# Patient Record
Sex: FEMALE | Race: WHITE | Employment: OTHER | URBAN - METROPOLITAN AREA
[De-identification: names, ages, dates, MRNs, and addresses within clinical notes are randomized per-mention and may not be internally consistent; named-entity substitution may affect disease eponyms.]

---

## 2017-01-03 ENCOUNTER — OFFICE VISIT (OUTPATIENT)
Dept: SURGERY | Facility: CLINIC | Age: 67
End: 2017-01-03

## 2017-01-03 VITALS
DIASTOLIC BLOOD PRESSURE: 73 MMHG | BODY MASS INDEX: 25.86 KG/M2 | SYSTOLIC BLOOD PRESSURE: 139 MMHG | OXYGEN SATURATION: 99 % | WEIGHT: 155.2 LBS | HEART RATE: 110 BPM | HEIGHT: 65 IN

## 2017-01-03 DIAGNOSIS — C21.0 ANAL SQUAMOUS CELL CARCINOMA (H): ICD-10-CM

## 2017-01-03 DIAGNOSIS — C21.0 ANAL SQUAMOUS CELL CARCINOMA (H): Primary | ICD-10-CM

## 2017-01-03 LAB
ALBUMIN SERPL-MCNC: 3.7 G/DL (ref 3.4–5)
ALP SERPL-CCNC: 88 U/L (ref 40–150)
ALT SERPL W P-5'-P-CCNC: 14 U/L (ref 0–50)
ANION GAP SERPL CALCULATED.3IONS-SCNC: 6 MMOL/L (ref 3–14)
AST SERPL W P-5'-P-CCNC: 19 U/L (ref 0–45)
BASOPHILS # BLD AUTO: 0 10E9/L (ref 0–0.2)
BASOPHILS NFR BLD AUTO: 0.3 %
BILIRUB SERPL-MCNC: 0.9 MG/DL (ref 0.2–1.3)
BUN SERPL-MCNC: 12 MG/DL (ref 7–30)
CALCIUM SERPL-MCNC: 9.1 MG/DL (ref 8.5–10.1)
CHLORIDE SERPL-SCNC: 106 MMOL/L (ref 94–109)
CO2 SERPL-SCNC: 28 MMOL/L (ref 20–32)
CREAT SERPL-MCNC: 0.74 MG/DL (ref 0.52–1.04)
DIFFERENTIAL METHOD BLD: NORMAL
EOSINOPHIL # BLD AUTO: 0 10E9/L (ref 0–0.7)
EOSINOPHIL NFR BLD AUTO: 0.3 %
ERYTHROCYTE [DISTWIDTH] IN BLOOD BY AUTOMATED COUNT: 12.9 % (ref 10–15)
GFR SERPL CREATININE-BSD FRML MDRD: 78 ML/MIN/1.7M2
GLUCOSE SERPL-MCNC: 98 MG/DL (ref 70–99)
HCT VFR BLD AUTO: 40 % (ref 35–47)
HGB BLD-MCNC: 13 G/DL (ref 11.7–15.7)
IMM GRANULOCYTES # BLD: 0 10E9/L (ref 0–0.4)
IMM GRANULOCYTES NFR BLD: 0.3 %
LYMPHOCYTES # BLD AUTO: 1.1 10E9/L (ref 0.8–5.3)
LYMPHOCYTES NFR BLD AUTO: 16.1 %
MCH RBC QN AUTO: 29.9 PG (ref 26.5–33)
MCHC RBC AUTO-ENTMCNC: 32.5 G/DL (ref 31.5–36.5)
MCV RBC AUTO: 92 FL (ref 78–100)
MONOCYTES # BLD AUTO: 0.5 10E9/L (ref 0–1.3)
MONOCYTES NFR BLD AUTO: 7.1 %
NEUTROPHILS # BLD AUTO: 5 10E9/L (ref 1.6–8.3)
NEUTROPHILS NFR BLD AUTO: 75.9 %
NRBC # BLD AUTO: 0 10*3/UL
NRBC BLD AUTO-RTO: 0 /100
PLATELET # BLD AUTO: 286 10E9/L (ref 150–450)
POTASSIUM SERPL-SCNC: 4 MMOL/L (ref 3.4–5.3)
PROT SERPL-MCNC: 7.3 G/DL (ref 6.8–8.8)
RBC # BLD AUTO: 4.35 10E12/L (ref 3.8–5.2)
SODIUM SERPL-SCNC: 140 MMOL/L (ref 133–144)
WBC # BLD AUTO: 6.6 10E9/L (ref 4–11)

## 2017-01-03 ASSESSMENT — PAIN SCALES - GENERAL: PAINLEVEL: NO PAIN (0)

## 2017-01-03 NOTE — NURSING NOTE
"Chief Complaint   Patient presents with     RECHECK     Follow up        Filed Vitals:    01/03/17 1052   BP: 139/73   Pulse: 110   Height: 5' 5\"   Weight: 155 lb 3.2 oz   SpO2: 99%       Body mass index is 25.83 kg/(m^2).      Andrea Matias CMA                                "

## 2017-01-03 NOTE — Clinical Note
1/3/2017       RE: Mya Gilmore  [address] [place] [address]     Dear Colleague,    Thank you for referring your patient, Mya Gilmore, to the COLON AND RECTAL SURGERY at [facility]. Please see a copy of my visit note below.    Colon and Rectal Surgery Clinic Note      RE: Mya Gilmore  : 1950  HECTOR: 1/3/2017      HISTORY OF PRESENT ILLNESS:  Mya returns for a followup visit.  She underwent chemoradiation for a stage IIIB/C, T3N3M0 anal cancer that she completed on 2016.  She continues to have her oncologic followup with Dr. Martin and is scheduled to see her sometime within the next month or 2.  I last saw her on 2016 and noted radiation telangiectasias and an anterior scar, but no evidence of recurrence.  Since that visit, Mya has noted several episodes of fresh red blood with bowel movements.  She otherwise is feeling well.      PHYSICAL EXAMINATION:  Perianal examination again demonstrates radiation telangiectasias, more marked on the left side than the right, on the perianal skin.  Digital rectal examination again shows an anterior scar that feels fibrotic and not rock-hard.  On anoscopy, there is a well-healed anterior scar, unchanged from its previous appearance.  Several distal anal telangiectasias are noted consistent with her prior radiation.      IMPRESSION:  Mya has a stable-appearing anal canal with a well-healed scar and no evidence of tumor recurrence.  Her hematochezia is almost certainly from her telangiectasias.  She does have small internal hemorrhoids, but I think the telangiectasias are the more likely problem.  I reassured her in this regard.  She will maintain her followup with Dr. Martin.  I asked her to follow up with me in 4 months' time.  We discussed her colonoscopy scheduling, which I think should be on an every 5 year basis based on her irradiation.  Her last colonoscopy was about   "years ago, so she will plan to schedule one in another 21/2 years.      I answered all of Mya's questions to her stated satisfaction.  She expressed an understanding and agreement with the proposed plan.      Total time 15 minutes.  Greater than half the time was spent in counseling.      cc:   Soren Camara MD   [facility]    [address] [place] [address]      Mora Martin MD   [facility]    [address] [place] [address]           For details of past medical history, surgical history, family history, medications, allergies, and review of systems, please see details below.    Medical history:  Past Medical History   Diagnosis Date     Osteopenia      Hemorrhoid      Irritable bowel syndrome      Cancer (H) 8/28/2014     Rectal      Breast cancer (H) 2010     Stage 0       Surgical history:  Past Surgical History   Procedure Laterality Date     Lumpectomy breast       Right breast     Tubal ligation       Colonoscopy  8/2013       Family history:  Family History   Problem Relation Age of Onset     CANCER Mother      ovarian     CANCER Sister      colon       Medications:  Current Outpatient Prescriptions   Medication Sig Dispense Refill     Probiotic Product (ULTRAFLORA TopPatch HEALTH PO)        Allergies:  The patientis allergic to nka.    Social history:  Social History   Substance Use Topics     Smoking status: Former Smoker     Smokeless tobacco: Never Used      Comment: Was social smoker until age 28;      Alcohol Use: Yes      Comment: occasionally     Marital status: .    Review of Systems:  Nursing Notes:   Andrea Matias CMA  1/3/2017 10:54 AM  Signed  Chief Complaint   Patient presents with     RECHECK     Follow up        Filed Vitals:    01/03/17 1052   BP: 139/73   Pulse: 110   Height: 5' 5\"   Weight: 155 lb 3.2 oz   SpO2: 99%       Body mass index is 25.83 kg/(m^2).      JULIAN Corbett " MD Александр   Professor and Chief  Division of Colon and Rectal Surgery  [facility]      Referring Provider:  Referred Self, MD  No address on file     Primary Care Provider:  Soren Camara        Again, thank you for allowing me to participate in the care of your patient.      Sincerely,    Jose Ramon Fountain MD

## 2017-01-03 NOTE — Clinical Note
1/3/2017      RE: Mya Gilmore  [address] [place] [address]       Colon and Rectal Surgery Clinic Note      RE: Mya Gilmore  : 1950  HECTOR: 1/3/2017      HISTORY OF PRESENT ILLNESS:  Mya returns for a followup visit.  She underwent chemoradiation for a stage IIIB/C, T3N3M0 anal cancer that she completed on 2016.  She continues to have her oncologic followup with Dr. Martin and is scheduled to see her sometime within the next month or 2.  I last saw her on 2016 and noted radiation telangiectasias and an anterior scar, but no evidence of recurrence.  Since that visit, Mya has noted several episodes of fresh red blood with bowel movements.  She otherwise is feeling well.      PHYSICAL EXAMINATION:  Perianal examination again demonstrates radiation telangiectasias, more marked on the left side than the right, on the perianal skin.  Digital rectal examination again shows an anterior scar that feels fibrotic and not rock-hard.  On anoscopy, there is a well-healed anterior scar, unchanged from its previous appearance.  Several distal anal telangiectasias are noted consistent with her prior radiation.      IMPRESSION:  Mya has a stable-appearing anal canal with a well-healed scar and no evidence of tumor recurrence.  Her hematochezia is almost certainly from her telangiectasias.  She does have small internal hemorrhoids, but I think the telangiectasias are the more likely problem.  I reassured her in this regard.  She will maintain her followup with Dr. Martin.  I asked her to follow up with me in 4 months' time.  We discussed her colonoscopy scheduling, which I think should be on an every 5 year basis based on her irradiation.  Her last colonoscopy was about 21/2 years ago, so she will plan to schedule one in another 21/2 years.      I answered all of Mya's questions to her stated satisfaction.  She expressed an understanding and agreement with the proposed  "plan.      Total time 15 minutes.  Greater than half the time was spent in counseling.      cc:   [place] [address]      Mora Martin MD   [facility] [address] [address]           For details of past medical history, surgical history, family history, medications, allergies, and review of systems, please see details below.    Medical history:  Past Medical History   Diagnosis Date     Osteopenia      Hemorrhoid      Irritable bowel syndrome      Cancer (H) 8/28/2014     Rectal      Breast cancer (H) 2010     Stage 0       Surgical history:  Past Surgical History   Procedure Laterality Date     Lumpectomy breast       Right breast     Tubal ligation       Colonoscopy  8/2013       Family history:  Family History   Problem Relation Age of Onset     CANCER Mother      ovarian     CANCER Sister      colon       Medications:  Current Outpatient Prescriptions   Medication Sig Dispense Refill     Probiotic Product (ULTRAFLORA IMMUNE HEALTH PO)        Allergies:  The patientis allergic to nka.    Social history:  Social History   Substance Use Topics     Smoking status: Former Smoker     Smokeless tobacco: Never Used      Comment: Was social smoker until age 28;      Alcohol Use: Yes      Comment: occasionally     Marital status: .    Review of Systems:  Nursing Notes:   Andrea Matias CMA  1/3/2017 10:54 AM  Signed  Chief Complaint   Patient presents with     RECHECK     Follow up        Filed Vitals:    01/03/17 1052   BP: 139/73   Pulse: 110   Height: 5' 5\"   Weight: 155 lb 3.2 oz   SpO2: 99%       Body mass index is 25.83 kg/(m^2).      JULIAN Corbett MD   Professor and Chief  Division of Colon and Rectal Surgery  [facility]      Referring Provider:  Referred Self, MD  No address on file     Primary Care Provider:  Soren Camara          "

## 2017-01-03 NOTE — PROGRESS NOTES
Colon and Rectal Surgery Clinic Note      RE: Mya Gilmore  : 1950  HECTOR: 1/3/2017      HISTORY OF PRESENT ILLNESS:  Mya returns for a followup visit.  She underwent chemoradiation for a stage IIIB/C, T3N3M0 anal cancer that she completed on 2016.  She continues to have her oncologic followup with Dr. Martin and is scheduled to see her sometime within the next month or 2.  I last saw her on 2016 and noted radiation telangiectasias and an anterior scar, but no evidence of recurrence.  Since that visit, Mya has noted several episodes of fresh red blood with bowel movements.  She otherwise is feeling well.      PHYSICAL EXAMINATION:  Perianal examination again demonstrates radiation telangiectasias, more marked on the left side than the right, on the perianal skin.  Digital rectal examination again shows an anterior scar that feels fibrotic and not rock-hard.  On anoscopy, there is a well-healed anterior scar, unchanged from its previous appearance.  Several distal anal telangiectasias are noted consistent with her prior radiation.      IMPRESSION:  Mya has a stable-appearing anal canal with a well-healed scar and no evidence of tumor recurrence.  Her hematochezia is almost certainly from her telangiectasias.  She does have small internal hemorrhoids, but I think the telangiectasias are the more likely problem.  I reassured her in this regard.  She will maintain her followup with Dr. Martin.  I asked her to follow up with me in 4 months' time.  We discussed her colonoscopy scheduling, which I think should be on an every 5 year basis based on her irradiation.  Her last colonoscopy was about 21/2 years ago, so she will plan to schedule one in another 21/2 years.      I answered all of Mya's questions to her stated satisfaction.  She expressed an understanding and agreement with the proposed plan.      Total time 15 minutes.  Greater than half the time was spent in counseling.     "  cc:   Soren Camara MD   [facility]    [address] [place], [place] [id number]      Mora Martin MD   [facility]    [address] [place] [address]           For details of past medical history, surgical history, family history, medications, allergies, and review of systems, please see details below.    Medical history:  Past Medical History   Diagnosis Date     Osteopenia      Hemorrhoid      Irritable bowel syndrome      Cancer (H) 8/28/2014     Rectal      Breast cancer (H) 2010     Stage 0       Surgical history:  Past Surgical History   Procedure Laterality Date     Lumpectomy breast       Right breast     Tubal ligation       Colonoscopy  8/2013       Family history:  Family History   Problem Relation Age of Onset     CANCER Mother      ovarian     CANCER Sister      colon       Medications:  Current Outpatient Prescriptions   Medication Sig Dispense Refill     Probiotic Product (ULTRAFLORA [employer] HEALTH PO)        Allergies:  The patientis allergic to nka.    Social history:  Social History   Substance Use Topics     Smoking status: Former Smoker     Smokeless tobacco: Never Used      Comment: Was social smoker until age 28;      Alcohol Use: Yes      Comment: occasionally     Marital status: .    Review of Systems:  Nursing Notes:   Andrea Matias CMA  1/3/2017 10:54 AM  Signed  Chief Complaint   Patient presents with     RECHECK     Follow up        Filed Vitals:    01/03/17 1052   BP: 139/73   Pulse: 110   Height: 5' 5\"   Weight: 155 lb 3.2 oz   SpO2: 99%       Body mass index is 25.83 kg/(m^2).      JULIAN Corbett MD   Professor and Chief  Division of Colon and Rectal Surgery  [facility]      Referring Provider:  Referred Self, MD  No address on file     Primary Care Provider:  Soren Camara      "

## 2017-01-03 NOTE — MR AVS SNAPSHOT
After Visit Summary   1/3/2017    Mya Gilmore    MRN: [id number]           Patient Information     Date Of Birth          1950        Visit Information        Provider Department      1/3/2017 11:00 AM Jose Ramon Fountain MD Colon and Rectal Surgery         Follow-ups after your visit        Your next 10 appointments already scheduled     Jan 03, 2017 12:00 PM   Lab with  LAB   [facility] ([facility])    [address] [place] [id number]   [phone]            Mar 06, 2017 11:15 AM   Lab with  LAB   [facility] ([facility])    [address] [place] [id number]   [phone]            Mar 06, 2017 11:40 AM   (Arrive by 11:25 AM)   CT CHEST ABDOMEN PELVIS W/O & W CONTRAST with UCCT2   [facility] CT ([facility])    [address] [place] [id number]   [phone]           Please bring any scans or X-rays taken at other hospitals, if similar tests were done. Also bring a list of your medicines, including vitamins, minerals and over-the-counter drugs. It is safest to leave personal items at home.  Be sure to tell your doctor:   If you have any allergies.   If there s any chance you are pregnant.   If you are breastfeeding.   If you have any special needs.  You may have contrast for this exam. To prepare:   Do not eat or drink for 2 hours before your exam. If you need to take medicine, you may take it with small sips of water. (We may ask you to take liquid medicine as well.)   The day before your exam, drink extra fluids at least six 8-ounce glasses (unless your doctor tells you to restrict your fluids).  Patients over 70 or patients with diabetes or kidney problems:   If you haven t had a blood test (creatinine test) within the last 30 days, go to your clinic or Diagnostic Imaging Department for this test.  If you have  diabetes:   If your kidney function is normal, continue taking your metformin (Avandamet, Glucophage, Glucovance, Metaglip) on the day of your exam.   If your kidney function is abnormal, wait 48 hours before restarting this medicine.  You will have oral contrast for this exam:   You will drink the contrast at home. Get this from your clinic or Diagnostic Imaging Department. Please follow the directions given.  Please wear loose clothing, such as a sweat suit or jogging clothes. Avoid snaps, zippers and other metal. We may ask you to undress and put on a hospital gown.  If you have any questions, please call the Imaging Department where you will have your exam.            Mar 06, 2017  2:30 PM   (Arrive by 2:15 PM)   Return Visit with Mora Martin MD   [facility] ([facility])    [address] [place] [address]  2nd [facility] [id number] [phone]            May 09, 2017 11:00 AM   (Arrive by 10:45 AM)   Return Visit with Jose Ramon Fountain MD   Colon and Rectal Surgery ([facility])    [address] [place] [address]  4th [facility] [address] [phone]              Who to contact     Please call your clinic at [phone] to:    Ask questions about your health    Make or cancel appointments    Discuss your medicines    Learn about your test results    Speak to your doctor   If you have compliments or concerns about an experience at your clinic, or if you wish to file a complaint, please contact [facility] Patient Relations at [phone] or email us at [email or web address]         Additional Information About Your Visit        [employer] Information     [employer] is an electronic gateway that provides easy, online access to your medical records. With [employer], you can request a clinic appointment, read your test results, renew a prescription or communicate with your care team.     To sign up for [employer]  "visit the website at [email or web address]   You will be asked to enter the access code listed below, as well as some personal information. Please follow the directions to create your username and password.     Your access code is: [email or web address]  Expires: 4/3/2017 11:15 AM     Your access code will  in 90 days. If you need help or a new code, please contact your [facility] or call [phone] for assistance.        Care EveryWhere ID     This is your Care EveryWhere ID. This could be used by other organizations to access your [facility] medical records  [id number]        Your Vitals Were     Pulse Height BMI (Body Mass Index) Pulse Oximetry          110 5' 5\" 25.83 kg/m2 99%         Blood Pressure from Last 3 Encounters:   17 139/73   16 136/69   16 133/72    Weight from Last 3 Encounters:   17 155 lb 3.2 oz   16 155 lb 9.6 oz   16 155 lb 3.2 oz              Today, you had the following     No orders found for display       Primary Care Provider Office Phone # Fax #    Soren Camara [phone]       [facility] [address]  [facility] [address]        Thank you!     Thank you for choosing COLON AND RECTAL SURGERY  for your care. Our goal is always to provide you with excellent care. Hearing back from our patients is one way we can continue to improve our services. Please take a few minutes to complete the written survey that you may receive in the mail after your visit with us. Thank you!             Your Updated Medication List - Protect others around you: Learn how to safely use, store and throw away your medicines at www.disposemymeds.org.          This list is accurate as of: 1/3/17 11:15 AM.  Always use your most recent med list.                   Brand Name Dispense Instructions for use    [facility] Hospital[facility] [employer]Tap (formerly [employer] HEALTH PO            "

## 2017-04-03 ENCOUNTER — ONCOLOGY VISIT (OUTPATIENT)
Dept: ONCOLOGY | Facility: CLINIC | Age: 67
End: 2017-04-03
Attending: INTERNAL MEDICINE
Payer: MEDICARE

## 2017-04-03 VITALS
OXYGEN SATURATION: 96 % | WEIGHT: 149.25 LBS | DIASTOLIC BLOOD PRESSURE: 80 MMHG | BODY MASS INDEX: 24.84 KG/M2 | RESPIRATION RATE: 16 BRPM | SYSTOLIC BLOOD PRESSURE: 131 MMHG | HEART RATE: 101 BPM | TEMPERATURE: 98.2 F

## 2017-04-03 DIAGNOSIS — C21.0 ANAL SQUAMOUS CELL CARCINOMA (H): ICD-10-CM

## 2017-04-03 DIAGNOSIS — C21.0 ANAL SQUAMOUS CELL CARCINOMA (H): Primary | ICD-10-CM

## 2017-04-03 LAB
ALBUMIN SERPL-MCNC: 3.7 G/DL (ref 3.4–5)
ALP SERPL-CCNC: 96 U/L (ref 40–150)
ALT SERPL W P-5'-P-CCNC: 12 U/L (ref 0–50)
ANION GAP SERPL CALCULATED.3IONS-SCNC: 8 MMOL/L (ref 3–14)
AST SERPL W P-5'-P-CCNC: 15 U/L (ref 0–45)
BASOPHILS # BLD AUTO: 0 10E9/L (ref 0–0.2)
BASOPHILS NFR BLD AUTO: 0.5 %
BILIRUB SERPL-MCNC: 0.7 MG/DL (ref 0.2–1.3)
BUN SERPL-MCNC: 13 MG/DL (ref 7–30)
CALCIUM SERPL-MCNC: 9.4 MG/DL (ref 8.5–10.1)
CHLORIDE SERPL-SCNC: 106 MMOL/L (ref 94–109)
CO2 SERPL-SCNC: 26 MMOL/L (ref 20–32)
CREAT SERPL-MCNC: 0.67 MG/DL (ref 0.52–1.04)
DIFFERENTIAL METHOD BLD: NORMAL
EOSINOPHIL # BLD AUTO: 0 10E9/L (ref 0–0.7)
EOSINOPHIL NFR BLD AUTO: 0.5 %
ERYTHROCYTE [DISTWIDTH] IN BLOOD BY AUTOMATED COUNT: 13 % (ref 10–15)
GFR SERPL CREATININE-BSD FRML MDRD: 88 ML/MIN/1.7M2
GLUCOSE SERPL-MCNC: 93 MG/DL (ref 70–99)
HCT VFR BLD AUTO: 40.9 % (ref 35–47)
HGB BLD-MCNC: 13.5 G/DL (ref 11.7–15.7)
IMM GRANULOCYTES # BLD: 0 10E9/L (ref 0–0.4)
IMM GRANULOCYTES NFR BLD: 0.3 %
LYMPHOCYTES # BLD AUTO: 1 10E9/L (ref 0.8–5.3)
LYMPHOCYTES NFR BLD AUTO: 18.2 %
MCH RBC QN AUTO: 30.4 PG (ref 26.5–33)
MCHC RBC AUTO-ENTMCNC: 33 G/DL (ref 31.5–36.5)
MCV RBC AUTO: 92 FL (ref 78–100)
MONOCYTES # BLD AUTO: 0.5 10E9/L (ref 0–1.3)
MONOCYTES NFR BLD AUTO: 9.1 %
NEUTROPHILS # BLD AUTO: 4.1 10E9/L (ref 1.6–8.3)
NEUTROPHILS NFR BLD AUTO: 71.4 %
NRBC # BLD AUTO: 0 10*3/UL
NRBC BLD AUTO-RTO: 0 /100
PLATELET # BLD AUTO: 319 10E9/L (ref 150–450)
POTASSIUM SERPL-SCNC: 4.3 MMOL/L (ref 3.4–5.3)
PROT SERPL-MCNC: 7.4 G/DL (ref 6.8–8.8)
RBC # BLD AUTO: 4.44 10E12/L (ref 3.8–5.2)
SODIUM SERPL-SCNC: 141 MMOL/L (ref 133–144)
WBC # BLD AUTO: 5.7 10E9/L (ref 4–11)

## 2017-04-03 PROCEDURE — 80053 COMPREHEN METABOLIC PANEL: CPT | Performed by: INTERNAL MEDICINE

## 2017-04-03 PROCEDURE — 99213 OFFICE O/P EST LOW 20 MIN: CPT | Mod: 25 | Performed by: INTERNAL MEDICINE

## 2017-04-03 PROCEDURE — 85025 COMPLETE CBC W/AUTO DIFF WBC: CPT | Performed by: INTERNAL MEDICINE

## 2017-04-03 PROCEDURE — 99211 OFF/OP EST MAY X REQ PHY/QHP: CPT

## 2017-04-03 PROCEDURE — 36415 COLL VENOUS BLD VENIPUNCTURE: CPT | Performed by: INTERNAL MEDICINE

## 2017-04-03 NOTE — Clinical Note
4/3/2017       RE: Mya Gilmore  [address] [place] [address]     Dear Colleague,    Thank you for referring your patient, Mya Gilmore, to the [facility]. Please see a copy of my visit note below.    No notes on file    Again, thank you for allowing me to participate in the care of your patient.      Sincerely,    Mora Martin MD

## 2017-04-03 NOTE — MR AVS SNAPSHOT
After Visit Summary   4/3/2017    Mya Gilmore    MRN: [id number]           Patient Information     Date Of Birth          1950        Visit Information        Provider Department      4/3/2017 1:00 PM Mora Martin MD [facility]        Today's Diagnoses     Anal squamous cell carcinoma (H)    -  1       Follow-ups after your visit        Follow-up notes from your care team     Return in about 6 months (around 10/3/2017).      Your next 10 appointments already scheduled     Oct 24, 2017 11:00 AM CDT   (Arrive by 10:45 AM)   Return Visit with Jose Ramon Fountain MD   [facility] Colon and Rectal Surgery ([facility])    [address] [place] [address]  4th Floor  [facility] [address] [phone]            Oct 25, 2017 10:15 AM CDT   (Arrive by 10:00 AM)   Return Visit with Mora Martin MD   [facility] ([facility])    [address] [place] [address]  2nd [facility] [address] [phone]              Who to contact     If you have questions or need follow up information about today's clinic visit or your schedule please contact [facility] directly at [phone].  Normal or non-critical lab and imaging results will be communicated to you by [employer], letter or phone within 4 business days after the clinic has received the results. If you do not hear from us within 7 days, please contact the clinic through [employer] or phone. If you have a critical or abnormal lab result, we will notify you by phone as soon as possible.  Submit refill requests through [employer] or call your pharmacy and they will forward the refill request to us. Please allow 3 business days for your refill to be completed.          Additional Information About Your Visit        [employer] Information     [employer] lets you send messages to your doctor, view your test results, renew your prescriptions, schedule appointments and more.  "To sign up, go to [email or web address] . Click on \"Log in\" on the left side of the screen, which will take you to the Welcome page. Then click on \"Sign up Now\" on the right side of the page.     You will be asked to enter the access code listed below, as well as some personal information. Please follow the directions to create your username and password.     Your access code is: [email or web address]  Expires: 11/3/2017  6:52 PM     Your access code will  in 90 days. If you need help or a new code, please call your [facility] clinic or [phone].        Care EveryWhere ID     This is your Care EveryWhere ID. This could be used by other organizations to access your [facility] medical records  [id number]        Your Vitals Were     Pulse Temperature Respirations Pulse Oximetry BMI (Body Mass Index)       101 98.2  F (36.8  C) 16 96% 24.84 kg/m2        Blood Pressure from Last 3 Encounters:   17 140/65   17 131/80   17 139/73    Weight from Last 3 Encounters:   17 67 kg (147 lb 11.2 oz)   17 67.7 kg (149 lb 4 oz)   17 70.4 kg (155 lb 3.2 oz)               Primary Care Provider Office Phone # Fax #    Soren Camara [phone]       [facility] [address]  [facility] [address]        Equal Access to Services     [facility] AH: Hadii aad ku hadasho Soomaali, waaxda luqadaha, qaybta kaalmada adeegyada, mani melvin . So [facility] [phone].    ATENCIÓN: Si habla español, tiene a gupta disposición servicios gratuitos de asistencia lingüística. Llame al [phone].    We comply with applicable federal civil rights laws and [place] laws. We do not discriminate on the basis of race, color, national origin, age, disability sex, sexual orientation or gender identity.            Thank you!     Thank you for choosing [facility]  for your care. Our goal is always to provide you with excellent care. Hearing back from our " patients is one way we can continue to improve our services. Please take a few minutes to complete the written survey that you may receive in the mail after your visit with us. Thank you!             Your Updated Medication List - Protect others around you: Learn how to safely use, store and throw away your medicines at www.disposemymeds.org.          This list is accurate as of: 4/3/17 11:59 PM.  Always use your most recent med list.                   Brand Name Dispense Instructions for use Diagnosis    [facility] ADOP HEALTH PO

## 2017-04-03 NOTE — LETTER
4/3/2017      RE: Mya Gilmore  [address] [place] [address]       No recurrence - RTC in 6 mths.     Mora Martin MD

## 2017-04-03 NOTE — NURSING NOTE
"Mya Gilmore is a 67 year old female who presents for:  Chief Complaint   Patient presents with     RECHECK     Patient here for return visit r/t Squamous cell carcinoma.         Initial Vitals:  /80  Pulse 101  Temp 98.2  F (36.8  C)  Resp 16  Wt 67.7 kg (149 lb 4 oz)  SpO2 96%  BMI 24.84 kg/m2 Estimated body mass index is 24.84 kg/(m^2) as calculated from the following:    Height as of 1/3/17: 1.651 m (5' 5\").    Weight as of this encounter: 67.7 kg (149 lb 4 oz).. Body surface area is 1.76 meters squared. BP completed using cuff size: regular  Data Unavailable No LMP recorded. Patient is postmenopausal. Allergies and medications reviewed.     Medications: Medication refills not needed today.  Pharmacy name entered into M[facility]: [facility] [address] [place] [address] AT [facility]    Comments: NA    2 minutes for nursing intake (face to face time)   Marilyn Mckeon RN        "

## 2017-04-04 ENCOUNTER — TELEPHONE (OUTPATIENT)
Dept: ONCOLOGY | Facility: CLINIC | Age: 67
End: 2017-04-04

## 2017-04-04 NOTE — PROGRESS NOTES
This is a followup visit   Apr 3, 2017      CHIEF COMPLAINT:  Squamous cell carcinoma of the anal canal, stage III-B/C, T3 N3 M0.  She was initially seen in 09/2014.      BACKGROUND:      The patient has a past medical history significant for DCIS of the right breast, status post lumpectomy and radiation completed in 2009 in [place], who presented with intermittent rectal bleeding for the past couple of months.  She saw Dr. Shaikh, and had an EUA on 08/28/2014.  Examination revealed a large palpable mass measuring 6 cm long and 4.5 cm thick involving the entire rectovaginal septum up to and including the anus with involvement of the external skin.  Biopsy confirmed deeply invasive poorly differentiated squamous cell carcinoma.  She had a staging CT of the chest, abdomen and pelvis on 09/27/2014, which showed a 6.6 x 5.7 x 5 cm rectal mass with rectal lumen narrowing to less than 5 mm, and a left lower lobe subcentimeter pulmonary nodule.  The patient was referred to see Dr. Fountain on 09/09/2014.  A physical exam did not show any palpable groin nodes.  There was a soft tissue mass at the anal verge anteriorly, and perineal body looked widened.  Distal vaginal exam demonstrated a hard mass that ran the length of the rectovaginal septum up to nearly the cervix with some mucus discharge from the vagina that did not look like bowel content.  The digital rectal exam confirmed the findings, and pathology, which was reviewed at our center, showed invasive poorly differentiated squamous cell carcinoma.  She proceeded to have a PET/CT scan on 09/23/2014.  Preliminary reports on the results confirmed the finding with the extent of the mass measuring 6.8 x 6.7 x 8.2 cm with an SUV of 20, and no definitive soft tissue metastases in the abdomen or pelvis.  There were multiple subcentimeter pulmonary nodules in the left lower lobe.  In addition, there was bilateral inguinal lymphadenopathy that was not hypermetabolic, but  maximum SUV of 2.6.  Her case was discussed at GI Conference, and the patient was recommended to do definitive chemoradiotherapy.  She saw Dr. Washington, and Dr. Cano decided to treat her with 5-FU and mitomycin along with radiation therapy.  The patient completed her chemoradiation course on 11/21/2014.       INTERVAL HISTORY:  The patient states that she is feeling well.  She states that she has been keeping busy off lately preparing for her daughters wedding. Also she fell and has a # in her L hand which hurts her a lot.  Otherwise, a 14-point review of systems was done and was negative.      PAST MEDICAL HISTORY:   Past Medical History:   Diagnosis Date     Breast cancer (H) 2010    Stage 0     Cancer (H) 8/28/2014    Rectal      Hemorrhoid      Irritable bowel syndrome      Osteopenia           PAST SURGICAL HISTORY:   Past Surgical History:   Procedure Laterality Date     COLONOSCOPY  8/2013     LUMPECTOMY BREAST      Right breast     TUBAL LIGATION            SOCIAL HISTORY:   Social History     Social History     Marital status:      Spouse name: N/A     Number of children: N/A     Years of education: N/A     Occupational History     Not on file.     Social History Main Topics     Smoking status: Former Smoker     Smokeless tobacco: Never Used      Comment: Was social smoker until age 28;      Alcohol use Yes      Comment: occasionally     Drug use: No     Sexual activity: Not on file     Other Topics Concern     Not on file     Social History Narrative          ALLERGIES:       Allergies   Allergen Reactions     Nka [No Known Allergies]           MEDICATIONS:   Current Outpatient Prescriptions   Medication     Probiotic Product (MeusonicA [employer] PO)     No current facility-administered medications for this visit.           FAMILY HISTORY:   Family History   Problem Relation Age of Onset     CANCER Mother      ovarian     CANCER Sister      colon          REVIEW OF SYSTEMS:  A 14-point  review of systems was done and was negative, unless as mentioned in the HPI.      PHYSICAL EXAMINATION:   VITAL SIGNS: /80  Pulse 101  Temp 98.2  F (36.8  C)  Resp 16  Wt 67.7 kg (149 lb 4 oz)  SpO2 96%  BMI 24.84 kg/m2    GENERAL:  Alert and oriented x3, no apparent distress.   HEENT:  Unremarkable for any mucositis or moist mucous membranes.   CARDIOVASCULAR:  S1, S2, clear.  No murmur, gallop or rub.   RESPIRATORY:  Clear to auscultation bilaterally.  No added sounds heard.   ABDOMEN:  Nontender to palpation.  No organomegaly appreciated.   RECTAL:  Exam was not performed.   EXTREMITIES:  Lower extremities with no pedal edema.   PSYCHIATRIC:  Affect appropriate.        LABORATORY DATA:  reveiwed in EMR and discussed with the patient      ASSESSMENT AND PLAN:  Patient with stage III-B/C anal canal cancer, T3 N3 M0, status post chemoradiation with 5-FU and mitomycin.  She completed her chemoradiation course on 11/21/2014 with no signs of local recurrence. Because of the T3 N3 nature of disease, she would need annual CT chest, abdomen and pelvis for the first 3 years. Her scan looks good.   She will RTC in 6 mths with labs and visit    # in Left forearm: management per local team.       Gyn : she is concerned about ovarian cancer and we discussed that there are no good screening tests, she should keep working with her PCP .     Mora Martin   of Medicine   Hematology and medical Oncology   [facility]

## 2017-04-20 ENCOUNTER — TELEPHONE (OUTPATIENT)
Dept: SURGERY | Facility: CLINIC | Age: 67
End: 2017-04-20

## 2017-04-20 NOTE — TELEPHONE ENCOUNTER
Due to provider unavailability (surgery), patients surgery on 05/09/17 needs to be rescheduled. Called patient and left a message informing her of this.  Provided our direct clinic number to reschedule.

## 2017-04-20 NOTE — TELEPHONE ENCOUNTER
*Correction to previous documentation*    Left message to reschedule patient's clinic appointment, not surgery.  (Typing error). RB

## 2017-06-27 ENCOUNTER — OFFICE VISIT (OUTPATIENT)
Dept: SURGERY | Facility: CLINIC | Age: 67
End: 2017-06-27

## 2017-06-27 VITALS
WEIGHT: 147.7 LBS | HEART RATE: 50 BPM | DIASTOLIC BLOOD PRESSURE: 65 MMHG | TEMPERATURE: 98.5 F | BODY MASS INDEX: 24.61 KG/M2 | SYSTOLIC BLOOD PRESSURE: 140 MMHG | HEIGHT: 65 IN | OXYGEN SATURATION: 98 %

## 2017-06-27 DIAGNOSIS — C21.0 ANAL SQUAMOUS CELL CARCINOMA (H): Primary | ICD-10-CM

## 2017-06-27 ASSESSMENT — PAIN SCALES - GENERAL: PAINLEVEL: NO PAIN (0)

## 2017-06-27 NOTE — LETTER
2017       RE: Mya Gilmore  [address] [place] [address]     Dear Colleague,    Thank you for referring your patient, Mya Gilmore, to the [facility] COLON AND RECTAL SURGERY at [facility]. Please see a copy of my visit note below.    Colon and Rectal Surgery Clinic Note      RE: Mya Gilmore  : 1950  HECTOR: 2017    Mya returns for a followup visit of her stage IIIB/C, T3 N3 M0 anal cancer.  She completed chemoradiation on 2016.  She last saw Dr. Martin in April, at which time a CT scan showed no evidence of recurrence.  Mya is feeling well and has no specific complaints.  She is busy planning her daughter's wedding, which will take place in July.  Since I last saw her, she fell off a ladder and fractured her arm.  That has healed.    PHYSICAL EXAMINATION: Mya looks very well.  Perianal examination shows radiation telangiectasias, unchanged.  Digital rectal examination shows anterior scarring, but no hard masses.  Endoscopy shows a fibrotic appearing anterior scar, stable from previous exams.  I do not see any evidence of local recurrence.    IMPRESSION: Mya seems to be doing well and I see no evidence of local recurrence.  I asked her to follow up in 4 months' time, which will be her 3-year anniversary of completing treatment.  After that, we can probably drop back to a q. 6 month surveillance schedule.    Total time 15 minutes, with half the time spent in counseling.    cc:  MD Mora Morin MD    For details of past medical history, surgical history, family history, medications, allergies, and review of systems, please see details below.    Medical history:  Past Medical History:   Diagnosis Date     Breast cancer (H)     Stage 0     Cancer (H) 2014    Rectal      Hemorrhoid      Irritable bowel syndrome      Osteopenia        Surgical history:  Past Surgical History:  "  Procedure Laterality Date     COLONOSCOPY  8/2013     LUMPECTOMY BREAST      Right breast     TUBAL LIGATION         Family history:  Family History   Problem Relation Age of Onset     CANCER Mother      ovarian     CANCER Sister      colon       Medications:  Current Outpatient Prescriptions   Medication Sig Dispense Refill     magnesium 100 MG CAPS        Probiotic Product ([employer] HEALTH PO)        Allergies:  The patientis allergic to nka [no known allergies].  Social history:  Social History   Substance Use Topics     Smoking status: Former Smoker     Smokeless tobacco: Never Used      Comment: Was social smoker until age 28;      Alcohol use Yes      Comment: occasionally     Marital status: .    Review of Systems:  Nursing Notes:   Benita Anderson LPN  6/27/2017 10:43 AM  Signed  Chief Complaint   Patient presents with     Clinic Care Coordination - Follow-up     4 month f/u.        Vitals:    06/27/17 1040   BP: 140/65   BP Location: Right arm   Patient Position: Chair   Cuff Size: Adult Regular   Pulse: 50   Temp: 98.5  F (36.9  C)   TempSrc: Oral   SpO2: 98%   Weight: 147 lb 11.2 oz   Height: 5' 5\"       Body mass index is 24.58 kg/(m^2).    Hayden CARDOSO LPN    Referring Provider:  Referred Self, MD  No address on file     Primary Care Provider:  Soren Camara    Again, thank you for allowing me to participate in the care of your patient.    Jose Ramon Fountain MD    "

## 2017-06-27 NOTE — PROGRESS NOTES
Colon and Rectal Surgery Clinic Note      RE: Mya Gilmore  : 1950  HECTOR: 2017      Mya returns for a followup visit of her stage IIIB/C, T3 N3 M0 anal cancer.  She completed chemoradiation on 2016.  She last saw Dr. Martin in April, at which time a CT scan showed no evidence of recurrence.  Mya is feeling well and has no specific complaints.  She is busy planning her daughter's wedding, which will take place in July.  Since I last saw her, she fell off a ladder and fractured her arm.  That has healed.    PHYSICAL EXAMINATION: Mya looks very well.  Perianal examination shows radiation telangiectasias, unchanged.  Digital rectal examination shows anterior scarring, but no hard masses.  Endoscopy shows a fibrotic appearing anterior scar, stable from previous exams.  I do not see any evidence of local recurrence.    IMPRESSION: Mya seems to be doing well and I see no evidence of local recurrence.  I asked her to follow up in 4 months' time, which will be her 3-year anniversary of completing treatment.  After that, we can probably drop back to a q. 6 month surveillance schedule.    Total time 15 minutes, with half the time spent in counseling.    cc:  MD Mora Morin MD      For details of past medical history, surgical history, family history, medications, allergies, and review of systems, please see details below.    Medical history:  Past Medical History:   Diagnosis Date     Breast cancer (H)     Stage 0     Cancer (H) 2014    Rectal      Hemorrhoid      Irritable bowel syndrome      Osteopenia        Surgical history:  Past Surgical History:   Procedure Laterality Date     COLONOSCOPY  2013     LUMPECTOMY BREAST      Right breast     TUBAL LIGATION         Family history:  Family History   Problem Relation Age of Onset     CANCER Mother      ovarian     CANCER Sister      colon       Medications:  Current Outpatient Prescriptions   Medication  "Sig Dispense Refill     magnesium 100 MG CAPS        Probiotic Product ([employer] HEALTH PO)        Allergies:  The patientis allergic to nka [no known allergies].    Social history:  Social History   Substance Use Topics     Smoking status: Former Smoker     Smokeless tobacco: Never Used      Comment: Was social smoker until age 28;      Alcohol use Yes      Comment: occasionally     Marital status: .    Review of Systems:  Nursing Notes:   Benita Anderson LPN  6/27/2017 10:43 AM  Signed  Chief Complaint   Patient presents with     Clinic Care Coordination - Follow-up     4 month f/u.        Vitals:    06/27/17 1040   BP: 140/65   BP Location: Right arm   Patient Position: Chair   Cuff Size: Adult Regular   Pulse: 50   Temp: 98.5  F (36.9  C)   TempSrc: Oral   SpO2: 98%   Weight: 147 lb 11.2 oz   Height: 5' 5\"       Body mass index is 24.58 kg/(m^2).    HEMANT Abdullahi MD   Professor and Chief  Division of Colon and Rectal Surgery  [facility]      Referring Provider:  Referred Self, MD  No address on file     Primary Care Provider:  Soren Camara  "

## 2017-06-27 NOTE — MR AVS SNAPSHOT
After Visit Summary   2017    Mya Gilmore    MRN: [id number]           Patient Information     Date Of Birth          1950        Visit Information        Provider Department      2017 10:30 AM Jose Ramon Fountain MD [facility] Colon and Rectal Surgery        Today's Diagnoses     Anal squamous cell carcinoma (H)    -  1       Follow-ups after your visit        Follow-up notes from your care team     Return in about 4 months (around 10/27/2017).      Your next 10 appointments already scheduled     Oct 24, 2017 11:00 AM CDT   (Arrive by 10:45 AM)   Return Visit with MD JOLENE Sung [facility] Colon and Rectal Surgery ([facility])    [address] [address] [phone]              Who to contact     Please call your clinic at [phone] to:    Ask questions about your health    Make or cancel appointments    Discuss your medicines    Learn about your test results    Speak to your doctor   If you have compliments or concerns about an experience at your clinic, or if you wish to file a complaint, please contact [facility] Patient Relations at [phone] or email us at [email or web address]         Additional Information About Your Visit        MyChart Information     Augustt is an electronic gateway that provides easy, online access to your medical records. With [employer], you can request a clinic appointment, read your test results, renew a prescription or communicate with your care team.     To sign up for Augustt visit the website at [email or web address]   You will be asked to enter the access code listed below, as well as some personal information. Please follow the directions to create your username and password.     Your access code is: [email or web address]  Expires: 11/3/2017  6:52 PM     Your access code will  in 90 days. If you need help or a new code, please contact your  "[facility] or call [phone] for assistance.        Care EveryWhere ID     This is your Care EveryWhere ID. This could be used by other organizations to access your [place] medical records  [id number]        Your Vitals Were     Pulse Temperature Height Pulse Oximetry BMI (Body Mass Index)       50 98.5  F (36.9  C) (Oral) 1.651 m (5' 5\") 98% 24.58 kg/m2        Blood Pressure from Last 3 Encounters:   06/27/17 140/65   04/03/17 131/80   01/03/17 139/73    Weight from Last 3 Encounters:   06/27/17 67 kg (147 lb 11.2 oz)   04/03/17 67.7 kg (149 lb 4 oz)   01/03/17 70.4 kg (155 lb 3.2 oz)              We Performed the Following     ANOSCOPY W/WO BRUSH/WASH        Primary Care Provider Office Phone # Fax #    Soren Camara [phone]       [facility] [address]  [facility] [address]        Equal Access to Services     [facility]: Hadii aad ku hadasho Sowili, waaxda luqadaha, qaybta kaalmada codie, mani melvin . So [facility] [phone].    ATENCIÓN: Si habla español, tiene a gupta disposición servicios gratuitos de asistencia lingüística. Marieame al [phone].    We comply with applicable federal civil rights laws and [place] laws. We do not discriminate on the basis of race, color, national origin, age, disability sex, sexual orientation or gender identity.            Thank you!     Thank you for choosing [facility] COLON AND RECTAL SURGERY  for your care. Our goal is always to provide you with excellent care. Hearing back from our patients is one way we can continue to improve our services. Please take a few minutes to complete the written survey that you may receive in the mail after your visit with us. Thank you!             Your Updated Medication List - Protect others around you: Learn how to safely use, store and throw away your medicines at www.disposemymeds.org.          This list is accurate as of: 6/27/17 11:59 PM.  " Always use your most recent med list.                   Brand Name Dispense Instructions for use Diagnosis    magnesium 100 MG Caps           [facility] [employer] HEALTH PO

## 2017-06-27 NOTE — NURSING NOTE
"Chief Complaint   Patient presents with     Clinic Care Coordination - Follow-up     4 month f/u.        Vitals:    06/27/17 1040   BP: 140/65   BP Location: Right arm   Patient Position: Chair   Cuff Size: Adult Regular   Pulse: 50   Temp: 98.5  F (36.9  C)   TempSrc: Oral   SpO2: 98%   Weight: 147 lb 11.2 oz   Height: 5' 5\"       Body mass index is 24.58 kg/(m^2).    Hayden CARDOSO LPN                          "

## 2017-10-24 ENCOUNTER — OFFICE VISIT (OUTPATIENT)
Dept: SURGERY | Facility: CLINIC | Age: 67
End: 2017-10-24

## 2017-10-24 VITALS
SYSTOLIC BLOOD PRESSURE: 141 MMHG | HEART RATE: 108 BPM | DIASTOLIC BLOOD PRESSURE: 72 MMHG | HEIGHT: 65 IN | OXYGEN SATURATION: 98 % | BODY MASS INDEX: 24.81 KG/M2 | WEIGHT: 148.9 LBS | TEMPERATURE: 98.5 F

## 2017-10-24 DIAGNOSIS — C21.0 ANAL SQUAMOUS CELL CARCINOMA (H): Primary | ICD-10-CM

## 2017-10-24 ASSESSMENT — PAIN SCALES - GENERAL: PAINLEVEL: NO PAIN (0)

## 2017-10-24 NOTE — NURSING NOTE
"No chief complaint on file.      Vitals:    10/24/17 1044   BP: 141/72   BP Location: Left arm   Patient Position: Chair   Cuff Size: Adult Regular   Pulse: 108   Temp: 98.5  F (36.9  C)   TempSrc: Oral   SpO2: 98%   Weight: 148 lb 14.4 oz   Height: 5' 5\"       Body mass index is 24.78 kg/(m^2).    Hayden CARDOSO LPN                     "

## 2017-10-24 NOTE — LETTER
10/24/2017       RE: Mya Gilmore  [address] [place] [address]     Dear Colleague,    Thank you for referring your patient, Mya Gilmore, to the [facility] COLON AND RECTAL SURGERY at [facility]. Please see a copy of my visit note below.    Colon and Rectal Surgery Clinic Note      RE: Mya Gilmore  : 1950  HECTOR: 10/24/2017      Mya returns for a follow-up visit for stage IIIB/C T3 N3 M0 anal cancer. She completed her chemoradiation in 2014. She has had ongoing oncologic follow-up with Dr. Martin. Mya is feeling generally very well. She did note some rectal bleeding of fresh blood into the toilet bowl over the past week or 2. She had a similar episode before her last clinic visit. Her last colonoscopy was about 4 years ago.    Miladis recently underwent prophylactic oophorectomy due to a family history of ovarian cancer in her mother, who was in her 70s. The gynecologist could evidently only find the left ovary. 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    Physical examination: There are perianal telangiectasias consistent with the patient's prior radiation. Digital rectal examination shows some fibrous scarring of the anal canal but no masses. Anoscopy shows telangiectasias and fibrotic scar anteriorly and possibly to the left. There is nothing at all suggestive of recurrent malignancy. Several anal telangiectasias are also noted, as are grade 2 hemorrhoids posteriorly.    Mya has no evidence of local tumor recurrence. She is probably bleeding from her radiation telangiectasias. The internal hemorrhoids are slightly enlarged but not dramatically so. I do think would be worthwhile in the face of the bleeding to get a repeat colonoscopy now rather than in the previously planned 5 year window, and Mya will schedule this closer to home. I also discussed that she should have BRCA  testing on account of her mother's history of ovarian cancer and her own personal history of breast DCIS. We will refer her to a genetic counselor at [facility] when she returns for her next follow-up visit with Dr. Martin. I asked Miladis to follow up with me in 6 months time.    I answered all of Mya's questions to her stated satisfaction. She expressed her understanding and agreement with the proposed plan.    Total time 20 minutes. Greater than half the time spent counseling.      For details of past medical history, surgical history, family history, medications, allergies, and review of systems, please see details below.    Medical history:  Past Medical History:   Diagnosis Date     Breast cancer (H) 2010    Stage 0     Cancer (H) 8/28/2014    Rectal      Hemorrhoid      Irritable bowel syndrome      Osteopenia        Surgical history:  Past Surgical History:   Procedure Laterality Date     COLONOSCOPY  8/2013     LUMPECTOMY BREAST      Right breast     TUBAL LIGATION         Family history:  Family History   Problem Relation Age of Onset     CANCER Mother      ovarian     CANCER Sister      colon       Medications:  Current Outpatient Prescriptions   Medication Sig Dispense Refill     magnesium 100 MG CAPS        Probiotic Product (boarding passA [employer] PO)        Allergies:  The patientis allergic to nka [no known allergies].    Social history:  Social History   Substance Use Topics     Smoking status: Former Smoker     Smokeless tobacco: Never Used      Comment: Was social smoker until age 28;      Alcohol use Yes      Comment: occasionally     Marital status: .    Review of Systems:  Nursing Notes:   Benita Anderson LPN  10/24/2017 10:46 AM  Signed  No chief complaint on file.      Vitals:    10/24/17 1044   BP: 141/72   BP Location: Left arm   Patient Position: Chair   Cuff Size: Adult Regular   Pulse: 108   Temp: 98.5  F (36.9  C)   TempSrc: Oral   SpO2: 98%   Weight:  "148 lb 14.4 oz   Height: 5' 5\"       Body mass index is 24.78 kg/(m^2).    HEMANT Abdullahi MD   Professor and Chief  Division of Colon and Rectal Surgery  [facility]      Referring Provider:  Referred Self, MD  No address on file     Primary Care Provider:  Soren Camara        "

## 2017-10-24 NOTE — PROGRESS NOTES
Colon and Rectal Surgery Clinic Note      RE: Mya Gilmore  : 1950  HECTOR: 10/24/2017      Mya returns for a follow-up visit for stage IIIB/C T3 N3 M0 anal cancer. She completed her chemoradiation in 2014. She has had ongoing oncologic follow-up with Dr. Martin. Mya is feeling generally very well. She did note some rectal bleeding of fresh blood into the toilet bowl over the past week or 2. She had a similar episode before her last clinic visit. Her last colonoscopy was about 4 years ago.    Miladis recently underwent prophylactic oophorectomy due to a family history of ovarian cancer in her mother, who was in her 70s. The gynecologist could evidently only find the left ovary. 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    Physical examination: There are perianal telangiectasias consistent with the patient's prior radiation. Digital rectal examination shows some fibrous scarring of the anal canal but no masses. Anoscopy shows telangiectasias and fibrotic scar anteriorly and possibly to the left. There is nothing at all suggestive of recurrent malignancy. Several anal telangiectasias are also noted, as are grade 2 hemorrhoids posteriorly.    Mya has no evidence of local tumor recurrence. She is probably bleeding from her radiation telangiectasias. The internal hemorrhoids are slightly enlarged but not dramatically so. I do think would be worthwhile in the face of the bleeding to get a repeat colonoscopy now rather than in the previously planned 5 year window, and Mya will schedule this closer to home. I also discussed that she should have BRCA testing on account of her mother's history of ovarian cancer and her own personal history of breast DCIS. We will refer her to a genetic counselor at [facility] when she returns for her next follow-up visit with Dr. Martin. I asked Miladis to follow up with me in 6 months  "time.    I answered all of Mya's questions to her stated satisfaction. She expressed her understanding and agreement with the proposed plan.    Total time 20 minutes. Greater than half the time spent counseling.      For details of past medical history, surgical history, family history, medications, allergies, and review of systems, please see details below.    Medical history:  Past Medical History:   Diagnosis Date     Breast cancer (H) 2010    Stage 0     Cancer (H) 8/28/2014    Rectal      Hemorrhoid      Irritable bowel syndrome      Osteopenia        Surgical history:  Past Surgical History:   Procedure Laterality Date     COLONOSCOPY  8/2013     LUMPECTOMY BREAST      Right breast     TUBAL LIGATION         Family history:  Family History   Problem Relation Age of Onset     CANCER Mother      ovarian     CANCER Sister      colon       Medications:  Current Outpatient Prescriptions   Medication Sig Dispense Refill     magnesium 100 MG CAPS        Probiotic Product ([employer]rug[employer]s PO)        Allergies:  The patientis allergic to nka [no known allergies].    Social history:  Social History   Substance Use Topics     Smoking status: Former Smoker     Smokeless tobacco: Never Used      Comment: Was social smoker until age 28;      Alcohol use Yes      Comment: occasionally     Marital status: .    Review of Systems:  Nursing Notes:   Benita Anderson LPN  10/24/2017 10:46 AM  Signed  No chief complaint on file.      Vitals:    10/24/17 1044   BP: 141/72   BP Location: Left arm   Patient Position: Chair   Cuff Size: Adult Regular   Pulse: 108   Temp: 98.5  F (36.9  C)   TempSrc: Oral   SpO2: 98%   Weight: 148 lb 14.4 oz   Height: 5' 5\"       Body mass index is 24.78 kg/(m^2).    HEMANT Abdullahi MD   Professor and Chief  Division of Colon and Rectal Surgery  [facility]      Referring Provider:  Referred MD Bartolome  No address on " file     Primary Care Provider:  Soren Camara

## 2018-04-24 ENCOUNTER — OFFICE VISIT (OUTPATIENT)
Dept: SURGERY | Facility: CLINIC | Age: 68
End: 2018-04-24
Payer: COMMERCIAL

## 2018-04-24 VITALS
HEART RATE: 98 BPM | WEIGHT: 153.1 LBS | DIASTOLIC BLOOD PRESSURE: 73 MMHG | BODY MASS INDEX: 25.51 KG/M2 | SYSTOLIC BLOOD PRESSURE: 123 MMHG | OXYGEN SATURATION: 95 % | TEMPERATURE: 98.3 F | HEIGHT: 65 IN

## 2018-04-24 DIAGNOSIS — C21.0 ANAL SQUAMOUS CELL CARCINOMA (H): Primary | ICD-10-CM

## 2018-04-24 DIAGNOSIS — Z80.3 FAMILY HISTORY OF MALIGNANT NEOPLASM OF BREAST: ICD-10-CM

## 2018-04-24 DIAGNOSIS — Z80.41 FAMILY HISTORY OF MALIGNANT NEOPLASM OF OVARY: ICD-10-CM

## 2018-04-24 ASSESSMENT — PAIN SCALES - GENERAL: PAINLEVEL: NO PAIN (0)

## 2018-04-24 NOTE — NURSING NOTE
"Chief Complaint   Patient presents with     Cancer     6 month follow up anal cancer.        Vitals:    04/24/18 1153   BP: 123/73   BP Location: Left arm   Patient Position: Chair   Cuff Size: Adult Regular   Pulse: 98   Temp: 98.3  F (36.8  C)   TempSrc: Oral   SpO2: 95%   Weight: 153 lb 1.6 oz   Height: 5' 5\"       Body mass index is 25.48 kg/(m^2).    Hayden CARDOSO LPN               "

## 2018-04-24 NOTE — MR AVS SNAPSHOT
"              After Visit Summary   2018    Mya Gilmore    MRN: [id number]           Patient Information     Date Of Birth          1950        Visit Information        Provider Department      2018 11:00 AM Jose Ramon Fountain MD [facility] Colon and Rectal Surgery         Follow-ups after your visit        Your next 10 appointments already scheduled     Oct 23, 2018 11:00 AM CDT   (Arrive by 10:45 AM)   Return Visit with MD JOLENE Sung [facility] Colon and Rectal Surgery ([facility] [place])    [address] [place] [id number] [phone]              Who to contact     Please call your clinic at [phone] to:    Ask questions about your health    Make or cancel appointments    Discuss your medicines    Learn about your test results    Speak to your doctor            Additional Information About Your Visit        MyChart Information     Twitter is an electronic gateway that provides easy, online access to your medical records. With Twitter, you can request a clinic appointment, read your test results, renew a prescription or communicate with your care team.     To sign up for [employer] visit the website at [email or web address]   You will be asked to enter the access code listed below, as well as some personal information. Please follow the directions to create your username and password.     Your access code is: [email or web address]  Expires: 2018  6:31 AM     Your access code will  in 90 days. If you need help or a new code, please contact your [facility] or call [phone] for assistance.        Care EveryWhere ID     This is your Care EveryWhere ID. This could be used by other organizations to access your [facility] medical records  [id number]        Your Vitals Were     Pulse Temperature Height Pulse Oximetry BMI (Body Mass Index)       98 98.3  F (36.8  C) (Oral) 5' 5\" 95% 25.48 kg/m2        Blood " Pressure from Last 3 Encounters:   04/24/18 123/73   10/24/17 141/72   06/27/17 140/65    Weight from Last 3 Encounters:   04/24/18 153 lb 1.6 oz   10/24/17 148 lb 14.4 oz   06/27/17 147 lb 11.2 oz              Today, you had the following     No orders found for display       Primary Care Provider Office Phone # Fax #    Soren Camara [phone]       [facility] [place] [address]  [facility] [address]        Equal Access to Services     [facility]: Hadii aad ku hadasho Soomaali, waaxda luqadaha, qaybta kaalmada ademagdalenoyakita, mani melvin . So [facility] [phone].    ATENCIÓN: Si habla español, tiene a gupta disposición servicios gratuitos de asistencia lingüística. [facility] [phone].    We comply with applicable federal civil rights laws and [place] laws. We do not discriminate on the basis of race, color, national origin, age, disability, sex, sexual orientation, or gender identity.            Thank you!     Thank you for choosing [facility] COLON AND RECTAL SURGERY  for your care. Our goal is always to provide you with excellent care. Hearing back from our patients is one way we can continue to improve our services. Please take a few minutes to complete the written survey that you may receive in the mail after your visit with us. Thank you!             Your Updated Medication List - Protect others around you: Learn how to safely use, store and throw away your medicines at www.disposemymeds.org.          This list is accurate as of 4/24/18 11:56 AM.  Always use your most recent med list.                   Brand Name Dispense Instructions for use Diagnosis    magnesium 100 MG Caps           UL[employer] PO

## 2018-04-24 NOTE — PROGRESS NOTES
Colon and Rectal Surgery Clinic Note      RE: Mya Gilmore  : 1950  HECTOR: 2018    Mya returns for a follow-up of her stage IIIb/cT3 N3 M0 anal cancer.  She completed chemoradiation in 2014.  Her last clinic visit on 10/24/2017 showed perianal and anal telangiectasias but no evidence of recurrence.  Mya has recently noted some minor hematochezia.  She is still due for a colonoscopy.    Physical examination: Perianal examination shows numerous telangiectasias.  Digital rectal examination shows a softening scar in the right anterolateral position.  There is nothing suggestive of malignancy.  Anoscopy shows a depression in the right anterolateral position near the dentate line consistent with the previous tumor site.  The mucosa is intact.  There are internal telangiectasias as well, which are likely responsible for the patient's bleeding.    Impression: Good progress with no evidence of tumor recurrence.  Bleeding almost certainly from the anal telangiectasias.  The patient promises to schedule a colonoscopy close to home.  We have discussed this previously.  She also requires genetic counseling and testing due to her personal history of DCIS and family history of ovarian cancer in her mother.  I again reinforced the importance of doing this.  We will put an order in to the EMR.  She has been advised by Dr. Martin that she does not need any further imaging.  Will check to see if Dr. Martin continues to want to see her in clinic.    Plan: Clinic follow-up in 6 months.    Total time 20 minutes.  Greater than half time spent counseling.      Examination was chaperoned by Jenn Jensen RN.      For details of past medical history, surgical history, family history, medications, allergies, and review of systems, please see details below.    Medical history:  Past Medical History:   Diagnosis Date     Breast cancer (H)     Stage 0     Cancer (H) 2014    Rectal      Hemorrhoid       "Irritable bowel syndrome      Osteopenia        Surgical history:  Past Surgical History:   Procedure Laterality Date     COLONOSCOPY  8/2013     LUMPECTOMY BREAST      Right breast     TUBAL LIGATION         Family history:  Family History   Problem Relation Age of Onset     CANCER Mother      ovarian     CANCER Sister      colon       Medications:  Current Outpatient Prescriptions   Medication Sig Dispense Refill     magnesium 100 MG CAPS        Probiotic Product ([employer] PO)        Allergies:  The patientis allergic to nka [no known allergies].    Social history:  Social History   Substance Use Topics     Smoking status: Former Smoker     Smokeless tobacco: Never Used      Comment: Was social smoker until age 28;      Alcohol use Yes      Comment: occasionally     Marital status: .    Review of Systems:  Nursing Notes:   Benita Anderson LPN  4/24/2018 11:55 AM  Signed  Chief Complaint   Patient presents with     Cancer     6 month follow up anal cancer.        Vitals:    04/24/18 1153   BP: 123/73   BP Location: Left arm   Patient Position: Chair   Cuff Size: Adult Regular   Pulse: 98   Temp: 98.3  F (36.8  C)   TempSrc: Oral   SpO2: 95%   Weight: 153 lb 1.6 oz   Height: 5' 5\"       Body mass index is 25.48 kg/(m^2).    HEMANT Abdullahi MD   Professor and Chief  Division of Colon and Rectal Surgery  [facility]      Referring Provider:  Referred Self, MD  No address on file     Primary Care Provider:  Soren Camara  "

## 2018-04-24 NOTE — LETTER
2018       RE: Mya Gilmore  [address] [place] [address]     Dear Colleague,    Thank you for referring your patient, Mya Gilmore, to the [facility] COLON AND RECTAL SURGERY at [facility]. Please see a copy of my visit note below.    Colon and Rectal Surgery Clinic Note      RE: Mya Gilmore  : 1950  HECTOR: 2018    Mya returns for a follow-up of her stage IIIb/cT3 N3 M0 anal cancer.  She completed chemoradiation in 2014.  Her last clinic visit on 10/24/2017 showed perianal and anal telangiectasias but no evidence of recurrence.  Mya has recently noted some minor hematochezia.  She is still due for a colonoscopy.    Physical examination: Perianal examination shows numerous telangiectasias.  Digital rectal examination shows a softening scar in the right anterolateral position.  There is nothing suggestive of malignancy.  Anoscopy shows a depression in the right anterolateral position near the dentate line consistent with the previous tumor site.  The mucosa is intact.  There are internal telangiectasias as well, which are likely responsible for the patient's bleeding.    Impression: Good progress with no evidence of tumor recurrence.  Bleeding almost certainly from the anal telangiectasias.  The patient promises to schedule a colonoscopy close to home.  We have discussed this previously.  She also requires genetic counseling and testing due to her personal history of DCIS and family history of ovarian cancer in her mother.  I again reinforced the importance of doing this.  We will put an order in to the EMR.  She has been advised by Dr. Martin that she does not need any further imaging.  Will check to see if Dr. Martin continues to want to see her in clinic.    Plan: Clinic follow-up in 6 months.    Total time 20 minutes.  Greater than half time spent counseling.      Examination was chaperoned by Jenn Jensen  "RN.      For details of past medical history, surgical history, family history, medications, allergies, and review of systems, please see details below.    Medical history:  Past Medical History:   Diagnosis Date     Breast cancer (H) 2010    Stage 0     Cancer (H) 8/28/2014    Rectal      Hemorrhoid      Irritable bowel syndrome      Osteopenia        Surgical history:  Past Surgical History:   Procedure Laterality Date     COLONOSCOPY  8/2013     LUMPECTOMY BREAST      Right breast     TUBAL LIGATION         Family history:  Family History   Problem Relation Age of Onset     CANCER Mother      ovarian     CANCER Sister      colon       Medications:  Current Outpatient Prescriptions   Medication Sig Dispense Refill     magnesium 100 MG CAPS        Probiotic Product (ULTRAFLORA [employer] PO)        Allergies:  The patientis allergic to nka [no known allergies].    Social history:  Social History   Substance Use Topics     Smoking status: Former Smoker     Smokeless tobacco: Never Used      Comment: Was social smoker until age 28;      Alcohol use Yes      Comment: occasionally     Marital status: .    Review of Systems:  Nursing Notes:   Benita Anderson LPN  4/24/2018 11:55 AM  Signed  Chief Complaint   Patient presents with     Cancer     6 month follow up anal cancer.        Vitals:    04/24/18 1153   BP: 123/73   BP Location: Left arm   Patient Position: Chair   Cuff Size: Adult Regular   Pulse: 98   Temp: 98.3  F (36.8  C)   TempSrc: Oral   SpO2: 95%   Weight: 153 lb 1.6 oz   Height: 5' 5\"       Body mass index is 25.48 kg/(m^2).    Hayden CARDOSO LPN           Referring Provider:  Referred Self, MD  No address on file     Primary Care Provider:  Soren Camara    Again, thank you for allowing me to participate in the care of your patient.      Sincerely,    Jose Ramon Fountain MD      "

## 2018-04-25 ENCOUNTER — TELEPHONE (OUTPATIENT)
Dept: ONCOLOGY | Facility: CLINIC | Age: 68
End: 2018-04-25

## 2018-04-25 NOTE — TELEPHONE ENCOUNTER
I spoke to patient, she inquired if she needs follow up with Dr Martin; per Dr Martin, it's patient's choice if she would like to continue with us or pt may continue to follow with Dr Fountain [id number].     Pt or Dr Fountain can refer back to Dr Martin as needed. Pt lives in [place] and it's a 2hour+ commute for her to come to the [facility]. She would prefer to follow with Dr Fountain and see Dr Martin PRN. She will call our office to book appts as needed.

## 2018-10-08 NOTE — TELEPHONE ENCOUNTER
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

## 2018-10-23 ENCOUNTER — OFFICE VISIT (OUTPATIENT)
Dept: SURGERY | Facility: CLINIC | Age: 68
End: 2018-10-23
Payer: COMMERCIAL

## 2018-10-23 ENCOUNTER — TELEPHONE (OUTPATIENT)
Dept: GASTROENTEROLOGY | Facility: CLINIC | Age: 68
End: 2018-10-23

## 2018-10-23 ENCOUNTER — PRE VISIT (OUTPATIENT)
Dept: ONCOLOGY | Facility: CLINIC | Age: 68
End: 2018-10-23

## 2018-10-23 ENCOUNTER — OFFICE VISIT (OUTPATIENT)
Dept: ONCOLOGY | Facility: CLINIC | Age: 68
End: 2018-10-23
Attending: GENETIC COUNSELOR, MS
Payer: MEDICARE

## 2018-10-23 VITALS
TEMPERATURE: 98.1 F | SYSTOLIC BLOOD PRESSURE: 133 MMHG | WEIGHT: 151.8 LBS | BODY MASS INDEX: 25.29 KG/M2 | DIASTOLIC BLOOD PRESSURE: 73 MMHG | HEART RATE: 97 BPM | HEIGHT: 65 IN

## 2018-10-23 DIAGNOSIS — Z80.41 FAMILY HISTORY OF MALIGNANT NEOPLASM OF OVARY: ICD-10-CM

## 2018-10-23 DIAGNOSIS — Z80.0 FAMILY HISTORY OF COLON CANCER: ICD-10-CM

## 2018-10-23 DIAGNOSIS — C21.0 ANAL SQUAMOUS CELL CARCINOMA (H): Primary | ICD-10-CM

## 2018-10-23 DIAGNOSIS — Z85.3 PERSONAL HISTORY OF MALIGNANT NEOPLASM OF BREAST: Primary | ICD-10-CM

## 2018-10-23 DIAGNOSIS — Z85.3 PERSONAL HISTORY OF MALIGNANT NEOPLASM OF BREAST: ICD-10-CM

## 2018-10-23 LAB — MISCELLANEOUS TEST: NORMAL

## 2018-10-23 PROCEDURE — 96040 ZZH GENETIC COUNSELING, EACH 30 MINUTES: CPT | Mod: ZF | Performed by: GENETIC COUNSELOR, MS

## 2018-10-23 RX ORDER — PHENOL 1.4 %
10 AEROSOL, SPRAY (ML) MUCOUS MEMBRANE
COMMUNITY

## 2018-10-23 NOTE — LETTER
"10/23/2018       RE: Mya Gilmore  [address] [place]  [place] [address]     Dear Colleague,    Thank you for referring your patient, Mya Gilmore, to the [facility] COLON AND RECTAL SURGERY at [facility]. Please see a copy of my visit note below.    Colon and Rectal Surgery Clinic Note      RE: Mya Gilmore  : 1950  HECTOR: 10/23/2018    Mya returns for a 6 month follow up of her stage IIIb/cT3 N3 M0 anal cancer s/p chemoradiation 2014. Her last clinic visit was 2018 that showed anal telangiectasias without evidence for recurrence. Since this time, the patient has had no concerns or issues. Regular bowel movements once daily without constipation or diarrhea. No further evidence for hematochezia. The patient has not completed a colonoscopy, noting that she would prefer to undergo a colonoscopy at the [facility] vs her home in [place]. She has an appointment with the genetic counselor today to discuss genetic testing given personal history of DCIS and family history of ovarian cancer. Last mammogram 2018 that was normal. Patient underwent ovarian removal in , however, the right ovary was unable to be identified, therefore, undergoing pelvic US to further evaluate.         Physical examination:  Examination was chaperoned by Laura Moreland CNP    Vitals: /73 (BP Location: Left arm, Patient Position: Sitting, Cuff Size: Adult Regular)  Pulse 97  Temp 98.1  F (36.7  C) (Oral)  Ht 1.651 m (5' 5\")  Wt 68.9 kg (151 lb 12.8 oz)  BMI 25.26 kg/m2  BMI= Body mass index is 25.26 kg/(m^2).    Perianal examination shows post radiation skin changes including telangiectasias. Distal rectal examination shows white scar tissue present right anterolateral across the midline to the left anterolateral position. No evidence for malignancy. Mucosa intact. Telangiectasias present internal anal without active bleeding.     Laboratory " data:    Recent Labs   Lab Test  04/03/17   1029   03/01/16   1232   WBC  5.7   < >   --    HGB  13.5   < >  12.9   PLT  319   < >  174   CR  0.67   < >   --    ALBUMIN  3.7   < >   --    BILITOTAL  0.7   < >   --    ALKPHOS  96   < >   --    ALT  12   < >   --    AST  15   < >   --    INR   --    --   1.03    < > = values in this interval not displayed.       Assessment/plan:  No evidence of tumor recurrence. Anal telangiectasias present without active bleeding. Plan for colonoscopy here, patient will make appointment. She will meet with genetic counselor today. Return to clinic in 6 months.        For details of past medical history, surgical history, family history, medications, allergies, and review of systems, please see details below.    Medical history:  Past Medical History:   Diagnosis Date     Breast cancer (H) 2010    Stage 0     Cancer (H) 8/28/2014    Rectal      Hemorrhoid      Irritable bowel syndrome      Osteopenia        Surgical history:  Past Surgical History:   Procedure Laterality Date     COLONOSCOPY  8/2013     LUMPECTOMY BREAST      Right breast     TUBAL LIGATION         Family history:  Family History   Problem Relation Age of Onset     Cancer Mother      ovarian     Cancer Sister      colon       Medications:  Current Outpatient Prescriptions   Medication Sig Dispense Refill     magnesium 100 MG CAPS        Melatonin 10 MG TABS tablet Take 10 mg by mouth       Probiotic Product (Hycrete PO)          Allergies:  The patientis allergic to nka [no known allergies].    Social history:  Social History   Substance Use Topics     Smoking status: Former Smoker     Smokeless tobacco: Never Used      Comment: Was social smoker until age 28;      Alcohol use Yes      Comment: occasionally     Marital status: .    Review of Systems:  Nursing Notes:   Constantine Tomlin CMA  10/23/2018 10:58 AM  Signed  Chief Complaint   Patient presents with     RECHECK     Six month  "follow-up.       Vitals:    10/23/18 1055   BP: 133/73   BP Location: Left arm   Patient Position: Sitting   Cuff Size: Adult Regular   Pulse: 97   Temp: 98.1  F (36.7  C)   TempSrc: Oral   Weight: 151 lb 12.8 oz   Height: 5' 5\"       Body mass index is 25.26 kg/(m^2).    Constantine Tomlin, EMT    I saw and examined the patient, led the discussion and edited the resident note.  I agree with the assessment and plan as outlined.  I personally performed the examination, which shows no evidence of local recurrence.  Correlate will be seeing the genetic counselors this afternoon, and will be scheduling a colonoscopy.  I sent in in basket to Dr. Martin to see if the patient requires further oncologic follow-up or scans.    Total time 15 minutes.  Greater than half the time was spent counseling.    Jose Ramon Fountain MD  Professor of Surgery    Jose Ramon Fountain MD   Professor and Chief  Division of Colon and Rectal Surgery  [facility]    Referring Provider:  Soren Camara MD  [facility]  [address] [place] [address]     Primary Care Provider:  Soren Camara    "

## 2018-10-23 NOTE — NURSING NOTE
Chief Complaint   Patient presents with     Lab Only     labs drawn with vpt by rn.      Labs drawn with vpt by rn.  Pt tolerated well.    Marleny West RN

## 2018-10-23 NOTE — MR AVS SNAPSHOT
After Visit Summary   10/23/2018    Mya Gilmore    MRN: [id number]           Patient Information     Date Of Birth          1950        Visit Information        Provider Department      10/23/2018 12:00 PM Monica Beck GC;  2 114 CONSULT [facility]        Today's Diagnoses     Personal history of malignant neoplasm of breast    -  1    Family history of malignant neoplasm of ovary        Family history of colon cancer          Care Instructions        Assessing Cancer Risk  Only about 5-10% of cancers are thought to be due to an inherited cancer susceptibility gene.    These families often have:    Several people with the same or related types of cancer    Cancers diagnosed at a young age (before age 50)    Individuals with more than one primary cancer    Multiple generations of the family affected with cancer    Some people may be candidates for genetic testing of more than one gene.  For these families, genetic testing using a cancer panel may be offered.  These panels will test different genes known to increase the risk for breast, ovarian, uterine, and/or other cancers. All of the genes discussed below have published clinical management guidelines for individuals who are found to carry a mutation. The purpose of this handout is to serve as a brief summary of the genes analyzed by the panels used to inquire about hereditary breast and gynecologic cancer:  VIMAL, BRCA1, BRCA2, BRIP1, CDH1, CHEK2, MLH1, MSH2, MSH6, PMS2, EPCAM, PTEN, PALB2, RAD51C, RAD51D, and TP53.  ______________________________________________________________________________  Hereditary Breast and Ovarian Cancer Syndrome   (BRCA1 and BRCA2)  A single mutation in one of the copies of BRCA1 or BRCA2 increases the risk for breast and ovarian cancer, among others.  The risk for pancreatic cancer and melanoma may also be slightly increased in some families.  The chart below shows the  chance that someone with a BRCA mutation would develop cancer in his or her lifetime1,2,3,4.        A person s ethnic background is also important to consider, as individuals of Ashkenazi Confucianist ancestry have a higher chance of having a BRCA gene mutation.  There are three BRCA mutations that occur more frequently in this population.    Delgado Syndrome   (MLH1, MSH2, MSH6, PMS2, and EPCAM)  Currently five genes are known to cause Delgado Syndrome: MLH1, MSH2, MSH6, PMS2, and EPCAM.  A single mutation in one of the Delgado Syndrome genes increases the risk for colon, endometrial, ovarian, and stomach cancers.  Other cancers that occur less commonly in Delgado Syndrome include urinary tract, skin, and brain cancers.  The chart below shows the chance that a person with Delgado syndrome would develop cancer in his or her lifetime5.      *Cancer risk varies depending on Delgado syndrome gene found    Cowden Syndrome   (PTEN)  Cowden syndrome is a hereditary condition that increases the risk for breast, thyroid, endometrial, colon, and kidney cancer.  Cowden syndrome is caused by a mutation in the PTEN gene.  A single mutation in one of the copies of PTEN causes Cowden syndrome and increases cancer risk.  The chart below shows the chance that someone with a PTEN mutation would develop cancer in their lifetime6,7.  Other benign features seen in some individuals with Cowden syndrome include benign skin lesions (facial papules, keratoses, lipomas), learning disability, autism, thyroid nodules, colon polyps, and larger head size.      *One recent study found breast cancer risk to be increased to 85%    Li-Fraumeni Syndrome   (TP53)  Li-Fraumeni Syndrome (LFS) is a cancer predisposition syndrome caused by a mutation in the TP53 gene. A single mutation in one of the copies of TP53 increases the risk for multiple cancers. Individuals with LFS are at an increased risk for developing cancer at a young age. The lifetime risk for development  of a LFS-associated cancer is 50% by age 30 and 90% by age 60.   Core Cancers: Sarcomas, Breast, Brain, Lung, Leukemias/Lymphomas, Adrenocortical carcinomas  Other Cancers: Gastrointestinal, Thyroid, Skin, Genitourinary    Hereditary Diffuse Gastric Cancer   (CDH1)  Currently, one gene is known to cause hereditary diffuse gastric cancer (HDGC): CDH1.  Individuals with HDGC are at increased risk for diffuse gastric cancer and lobular breast cancer. Of people diagnosed with HDGC, 30-50% have a mutation in the CDH1 gene.  This suggests there are likely other genes that may cause HDGC that have not been identified yet.      Lifetime Cancer Risks    General Population HDGC    Diffuse Gastric  <1% ~80%   Breast 12% 39-52%         Additional Genes  VIMAL  VIMAL is a moderate-risk breast cancer gene. Women who have a mutation in VIMAL can have between a 2-4 fold increased risk for breast cancer compared to the general population8. VIMAL mutations have also been associated with increased risk for pancreatic cancer, however an estimate of this cancer risk is not well understood9. Individuals who inherit two VIMAL mutations have a condition called ataxia-telangiectasia (AT).  This rare autosomal recessive condition affects the nervous system and immune system, and is associated with progressive cerebellar ataxia beginning in childhood.  Individuals with ataxia-telangiectasia often have a weakened immune system and have an increased risk for childhood cancers.    PALB2  Mutations in PALB2 have been shown to increase the risk of breast cancer up to 33-58% in some families; where individuals fall within this risk range is dependent upon family yananpi30. PALB2 mutations have also been associated with increased risk for pancreatic cancer, although this risk has not been quantified yet.  Individuals who inherit two PALB2 mutations--one from their mother and one from their father--have a condition called Fanconi Anemia.  This rare autosomal  recessive condition is associated with short stature, developmental delay, bone marrow failure, and increased risk for childhood cancers.    CHEK2   CHEK2 is a moderate-risk breast cancer gene.  Women who have a mutation in CHEK2 have around a 2-fold increased risk for breast cancer compared to the general population, and this risk may be higher depending upon family history.11,12,13 Mutations in CHEK2 have also been shown to increase the risk of a number of other cancers, including colon and prostate, however these cancer risks are currently not well understood.    BRIP1, RAD51C and RAD51D  Mutations in BRIP1, RAD51C, and RAD51D have been shown to increase the risk of ovarian cancer and possibly female breast cancer as well14,15 .       Lifetime Cancer Risk    General Population BRIP1 RAD51C RAD51D   Ovarian 1-2% ~5-8% ~5-9% ~7-15%           Inheritance  All of the cancer syndromes reviewed above are inherited in an autosomal dominant pattern.  This means that if a parent has a mutation, each of his or her children will have a 50% chance of inheriting that same mutation.  Therefore, each child--male or female--would have a 50% chance of being at increased risk for developing cancer.      Image obtained from Genetics Home Reference, 2013     Mutations in some genes can occur de rhianna, which means that a person s mutation occurred for the first time in them and was not inherited from a parent.  Now that they have the mutation, however, it can be passed on to future generations.    Genetic Testing  Genetic testing involves a blood test and will look at the genetic information in the VIMAL, BRCA1, BRCA2, BRIP1, CDH1, CHEK2, MLH1, MSH2, MSH6, PMS2, EPCAM, PTEN, PALB2, RAD51C, RAD51D, and TP53 genes for any harmful mutations that are associated with increased cancer risk.  If possible, it is recommended that the person(s) who has had cancer be tested before other family members.  That person will give us the most useful  information about whether or not a specific gene is associated with the cancer in the family.    Results  There are three possible results of genetic testing:    Positive--a harmful mutation was identified in one or more of the genes    Negative--no mutation was identified in any of the genes on this panel    Variant of unknown significance--a variation in one of the genes was identified, but it is unclear how this impacts cancer risk in the family    Advantages and Disadvantages   There are advantages and disadvantages to genetic testing.    Advantages    May clarify your cancer risk    Can help you make medical decisions    May explain the cancers in your family    May give useful information to your family members (if you share your results)    Disadvantages    Possible negative emotional impact of learning about inherited cancer risk    Uncertainty in interpreting a negative test result in some situations    Possible genetic discrimination concerns (see below)    Genetic Information Nondiscrimination Act (RAJESH)  RAJESH is a federal law that protects individuals from health insurance or employment discrimination based on a genetic test result alone.  Although rare, there are currently no legal discrimination protections in terms of life insurance, long term care, or disability insurances.  Visit the National Human Genome Research [place] website to learn more.    Reducing Cancer Risk  All of the genes described above have nationally recognized cancer screening guidelines that would be recommended for individuals who test positive.  In addition to increased cancer screening, surgeries may be offered or recommended to reduce cancer risk.  Recommendations are based upon an individual s genetic test result as well as their personal and family history of cancer.    Questions to Think About Regarding Genetic Testing:    What effect will the test result have on me and my relationship with my family members if I have an  inherited gene mutation?  If I don t have a gene mutation?    Should I share my test results, and how will my family react to this news, which may also affect them?    Are my children ready to learn new information that may one day affect their own health?    Hereditary Cancer Resources    FORCE: Facing Our Risk of Cancer Empowered facingourrisk.org   Bright Pink bebrightpink.org   Li-Fraumeni Syndrome Association lfsassociation.org   PTEN World [email or web address].[email or web address]   No stomach for cancer, Inc. nostomachforcancer.org   Stomach cancer relief network Scrnet.org   Collaborative Group of the Americas on Inherited Colorectal Cancer (CGA) cgaicc.com    Cancer Care cancercare.org   American Cancer Society (ACS) cancer.org   National Cancer [place] (NCI) cancer.gov     Please call us if you have any questions or concerns.   Cancer Risk Management Program [phone] ([phone])  ? Kari Morillo, MS, [facility]  [phone]  ? Aparna Peter, MS, [facility]  [phone]  ? Tori Solano, MS, [facility]  [phone]  ? Marva Jennings, MS, [facility]  [phone]  ? Monica Beck, MS, [facility] [phone]    References  1. Casey Kirk PDP, Rita S, Jonathon OLIVEROS, Arcenio JE, Leny JL, Iris N, Shavon H, Joce O, Milka A, Suraj B, Lesa P, Rashida S, Janelle DM, Pablo N, Aury E, Delmer H, Michael E, Michel J, Shamika J, Irma B, Tuleddie H, Thorlacius S, Eerola H, Muna H, Byron K, Neto OP. Average risks of breast and ovarian cancer associated with BRCA1 or BRCA2 mutations detected in case series unselected for family history: a combined analysis of 222 studies. Am J Hum Josie. 2003;72:1117-30.  2. Shar N, Annemarie M, Frankie G.  BRCA1 and BRCA2 Hereditary Breast and Ovarian Cancer. Gene Reviews online. 2013.  3. Jacobo YC, She S, Ariel G, Jory S. Breast cancer risk among male BRCA1 and BRCA2 mutation carriers. J Natl Cancer Inst. 2007;99:1811-4.  4. Sravan BANKS, Megan I, Alexander J, Jass E, Krystin HUITRON, Cierra ESQUIVEL.  Risk of breast cancer in male BRCA2 carriers. J Med Josie. 2010;47:710-1.  5. National Comprehensive Cancer Network. Clinical practice guidelines in oncology, colorectal cancer screening. Available online (registration required). 2015.  6. Yaya MH, Steffanie J, Sebastien J, Sayra LA, Lea MS, Andre C. Lifetime cancer risks in individuals with germline PTEN mutations. Clin Cancer Res. 2012;18:400-7.  7. Michela GALICIA. Cowden Syndrome: A Critical Review of the Clinical Literature. J Josie . 2009:18:13-27.  8. Alan A, Pal D, Mario S, Yamilka P, Fortino T, Ingrid M, Dong B, Manny H, Rosamaria R, Any K, Fatemeh L, Sravan DG, Janelle D, Brian DF, Karyn MR, The Breast Cancer Susceptibility Collaboration (UK) & Diana TODD. VIMAL mutations that cause ataxia-telangiectasia are breast cancer susceptibility alleles. Nature Genetics. 2006;38:873-875  9. Will N , Jonathan Y, Sisi J, Christiano L, Jailyn GM , Penelope ML, Gallinger S, Shukla AG, Syngal S, Clark ML, Madina J , Cassie R, Lana SZ, Eskiersten JR, Debi VE, Philomena M, Vogelstein B, Gala N, Gildardo RH, Kamaljit KW, and Beulah AP. VIMAL mutations in patients with hereditary pancreatic cancer. Cancer Discover. 2012;2:41-46  10. Jefferson PAULINO, et al. Breast-Cancer Risk in Families with Mutations in PALB2. NEJM. 2014; 371(6):497-506.  11. CHEK2 Breast Cancer Case-Control Consortium. CHEK2*1100delC and susceptibility to breast cancer: A collaborative analysis involving 10,860 breast cancer cases and 9,065 controls from 10 studies. Am J Hum Josie, 74 (2004), pp. 0234-6263  12. Vida VALENTINE, Corie S, Ankush K, et al. Spectrum of Mutations in BRCA1, BRCA2, CHEK2, and TP53 in Families at High Risk of Breast Cancer. JHOAN. 2006;295(12):9527-7260.   13. Oral DAVIES, Estrella KLINE, Ewa BUSH, et al. Risk of breast cancer in women with a CHEK2 mutation with and without a family history of breast cancer. J Clin Oncol. 2011;29:6769-0070.  14. Collin JOHNSON, Andrey E, Rui ESTRADA, et  al. Contribution of germline mutations in the RAD51B, RAD51C, and RAD51D genes to ovarian cancer in the population. J Clin Oncol. 2015;33(26):6116-9200. Doi:10.1200/JCO.2015.61.2408.  15. Delia T, Escobar DF, Fouzia P, et al. Mutations in BRIP1 confer high risk of ovarian cancer. Sofía Josie. 2011;43(11):9483-9612. doi:10.1038/ng.955.          Follow-ups after your visit        Your next 10 appointments already scheduled     Oct 23, 2018  1:15 PM CDT   [facility] Lab Draw with  [facility] DRAW   [facility] Lab Draw ([facility])    [address]  [address]  [facility] [address] [phone]            Apr 23, 2019 11:00 AM CDT   (Arrive by 10:45 AM)   Return Visit with Jose Ramon Fountain MD   [facility] Colon and Rectal Surgery ([facility])    [address] [place] [address]  4th Floor  [facility] [address] [phone]              Future tests that were ordered for you today     Open Future Orders        Priority Expected Expires Ordered    CancerNext genetic testing, [place] test: Laboratory Miscellaneous Order Routine  10/23/2019 10/23/2018            Who to contact     If you have questions or need follow up information about today's clinic visit or your schedule please contact [facility] directly at [phone].  Normal or non-critical lab and imaging results will be communicated to you by MyChart, letter or phone within 4 business days after the clinic has received the results. If you do not hear from us within 7 days, please contact the clinic through MyChart or phone. If you have a critical or abnormal lab result, we will notify you by phone as soon as possible.  Submit refill requests through [email or web address] or call your pharmacy and they will forward the refill request to us. Please allow 3 business days for your refill to be completed.          Additional Information About Your Visit        Care EveryWhere ID     This is your Care  EveryWhere ID. This could be used by other organizations to access your [place] medical records  [id number]         Blood Pressure from Last 3 Encounters:   10/23/18 133/73   04/24/18 123/73   10/24/17 141/72    Weight from Last 3 Encounters:   10/23/18 68.9 kg (151 lb 12.8 oz)   04/24/18 69.4 kg (153 lb 1.6 oz)   10/24/17 67.5 kg (148 lb 14.4 oz)               Primary Care Provider Office Phone # Fax #    Soren Camara [phone]       [facility] [address]  [facility] [address]        Equal Access to Services     FABIENNE BUENROSTRO : Fang mendez Sowili, wazay couchadaha, qaybta kaalmada ademagdalenoyada, mani mckeon. So [place] [phone].    ATENCIÓN: Si habla español, tiene a gupta disposición servicios gratuitos de asistencia lingüística. Llame al [phone].    We comply with applicable federal civil rights laws and [place] laws. We do not discriminate on the basis of race, color, national origin, age, disability, sex, sexual orientation, or gender identity.            Thank you!     Thank you for choosing [facility] [place]  for your care. Our goal is always to provide you with excellent care. Hearing back from our patients is one way we can continue to improve our services. Please take a few minutes to complete the written survey that you may receive in the mail after your visit with us. Thank you!             Your Updated Medication List - Protect others around you: Learn how to safely use, store and throw away your medicines at www.disposemymeds.org.          This list is accurate as of 10/23/18 12:48 PM.  Always use your most recent med list.                   Brand Name Dispense Instructions for use Diagnosis    magnesium 100 MG Caps           Melatonin 10 MG Tabs tablet      Take 10 mg by mouth        [facility]

## 2018-10-23 NOTE — LETTER
Cancer Risk Management  Program Locations    [facility]  [facility]  Mailing Address  Cancer Risk Management Program  [facility]  [address]  [place] [address]    New patient appointments  [phone]  October 30, 2018    Mya Gilmore  [address] [place] [address]      Dear Mya,    It was a pleasure meeting with you at the [facility] on October 23, 2018. Here is a copy of the progress note from your recent genetic counseling visit to the Cancer Risk Management Program. If you have any additional questions, please feel free to call.    10/23/2018    Referring Provider: Jose Ramon Fountain MD    Presenting Information:   I met with Mya Gilmore today for genetic counseling at the Cancer Risk Management Program at the [facility] to discuss her personal history of breast and anorectum cancer, as well as her family history of ovarian and colon cancer. She is here today to review this history, cancer screening recommendations, and available genetic testing options.    Personal History:  Mya is a 68 year old female. 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 Treatment included a lumpectomy and radiation. 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      She had her first menstrual period at age 11, her first child at age 32, and went through menopause in her 50's. Mya has her uterus in place. She had surgery to remove her ovaries at age 67, though it was noted that only the left ovary and fallopian tube were identified. She reports a brief history of oral contraceptive use and that she used hormone replacement therapy for  "5-10 years.      She has annual clinical breast exams and mammograms; she reports that her most recent mammogram in August 2018 was normal. Her most recent colonoscopy in 2013 was normal. Dr. Fountain has recommended that she schedule another colonoscopy in the near future. She does not regularly do any other cancer screening at this time. Mya reported that she quit smoking at age 28 and occasionally drinks alcohol.    Family History: (Please see scanned pedigree for detailed family history information)    One sister (unknown if a full sibling or maternal half sibling) was diagnosed with colon cancer in her 50's and passed away at age 57.    Mya's mother was diagnosed with ovarian cancer at age 74 and passed away at age 77; treatment included surgery, chemotherapy, and radiation.    One maternal aunt was diagnosed with throat cancer and passed away in her 50's; she had a history of smoking.    One maternal uncle was diagnosed with an unknown cancer in his 60's and passed away.    Mya reports that she has two potential biological fathers and provided information for both individuals.    One potential paternal family history:    Mya's biological father was possibly diagnosed with a bowel cancer and passed away at age 88; he may have also had a few colon polyps.    One paternal uncle was potentially diagnosed with and passed away from colon cancer in his 60's.    Other potential paternal family history:    One paternal uncle had a shoulder \"bump\" identified before passing away in his 60/70's that turned into a cancer.    Mya's paternal grandfather was diagnosed with and passed away from stomach cancer in his 40/50's; his smoking and alcohol history are unknown.    Her maternal ethnicity is [place] Japanese. Her paternal ethnicity may be [place] or /. There is no known Ashkenazi Restorationist ancestry on either side of her family. There is no reported " consanguinity.    Discussion:    Mya's personal history of breast cancer and family history of ovarian and colon cancer is suggestive of a hereditary cancer syndrome.    We reviewed the features of sporadic, familial, and hereditary cancers. In looking at Mya's family history, it is possible that a cancer susceptibility gene is present as she was diagnosed with breast cancer and has several close maternal and paternal relatives diagnosed with related cancers (i.e. ovarian, colon, stomach, etc.). Other cancers in Mya's family, including her anorectal cancer and maternal aunt's throat cancer, are less likely to have a hereditary explanation.    We discussed the natural history and genetics of several hereditary cancer syndromes, including Hereditary Breast and Ovarian Cancer (HBOC) syndrome and Delgado syndrome. A detailed handout regarding these syndromes and the information we discussed was provided to Mya at the end of our appointment today and can be found in the after visit summary. Topics included: inheritance pattern, cancer risks, cancer screening recommendations, and also risks, benefits and limitations of testing.    We reviewed that the most common cause of hereditary breast and ovarian cancer is HBOC syndrome, which is caused by mutations in the BRCA1 and BRCA2 genes. Individuals with HBOC syndrome are at increased risk for several different cancers, including breast, ovarian, male breast, prostate, melanoma, and pancreatic cancer.    Based on her personal and family history, Mya meets current National Comprehensive Cancer Network (NCCN) criteria for genetic testing of BRCA1 and BRCA2.      Another potential explanation for the family history is Delgado syndrome, which is caused by mutations in the MLH1, MSH2, MSH6, PMS2, and EPCAM genes. Individuals with Delgado syndrome are at increased risk for several different cancers, including colon, ovarian, stomach, and potentially breast  cancer.    We discussed that there are additional genes that could cause increased risk for the cancers in Mya's family. As many of these genes present with overlapping features in a family and accurate cancer risk cannot always be established based upon the pedigree analysis alone, it would be reasonable for Mya to consider panel genetic testing to analyze multiple genes at once.    Mya expressed interest in learning as much information as possible from genetic testing. As such, we discussed that genetic testing is available for 34 genes associated with hereditary cancer: CancerNext (APC, VIMAL, BARD1, BRCA1, BRCA2, BRIP1, BMPR1A, CDH1, CDK4, CDKN2A, CHEK2, DICER1, EPCAM, GREM1, HOXB13, MLH1, MRE11A, MSH2, MSH6, MUTYH, NBN, NF1, PALB2, PMS2, POLD1, POLE, PTEN, RAD50, RAD51C, RAD51D, SMAD4, SMARCA4, STK11, and TP53).    We discussed that many of the genes in the CancerNext panel are associated with specific hereditary cancer syndromes and published management guidelines: HBOC syndrome (BRCA1, BRCA2), Delgado syndrome (MLH1, MSH2, MSH6, PMS2, EPCAM), Familial Adenomatous Polyposis (APC), Hereditary Diffuse Gastric Cancer (CDH1), Familial Atypical Multiple Mole Melanoma syndrome (CDK4, CDKN2A), Juvenile Polyposis syndrome (BMPR1A, SMAD4), Cowden syndrome (PTEN), Li Fraumeni syndrome (TP53), Peutz-Jeghers syndrome (STK11), MUTYH Associated Polyposis (MUTYH), and Neurofibromatosis type 1 (NF1).     The VIMAL, BRIP1, CHEK2, GREM1, NBN , PALB2, POLD1, POLE, RAD51C, and RAD51D genes are associated with increased cancer risk and have published management guidelines for certain cancers.      The remaining genes (BARD1, DICER1, HOXB13, MRE11A, RAD50, and SMARCA4) are associated with increased cancer risk and may allow us to make medical recommendations when mutations are identified.      Mya was provided with a detailed brochure from [facility] explaining the CancerNext testing.    Consent was obtained and  genetic testing for CancerNext was sent to [facility]. Turn around time: 3-4 weeks.    Medical Management: For Mya, we reviewed that the information from genetic testing may determine:    additional cancer screening for which Mya may qualify (i.e. annual mammogram and breast MRI, colonoscopies every 1-5 years, more frequent dermatologic exams, etc.),    options for risk reducing surgeries Mya could consider (i.e. bilateral mastectomy, surgery to remove her uterus, etc.),      and targeted chemotherapies if she were to develop certain cancers in the future (i.e. immunotherapy for individuals with Delgado syndrome, PARP inhibitors, etc.).     These recommendations and possible targeted chemotherapies will be discussed in detail once genetic testing is completed.     Plan:  1) Today Mya elected to proceed with genetic testing using the CancerNext panel offered by [facility].  2) This information should be available in 3-4 weeks.  3) Mya will first be called with her test results. She will then be invited back to clinic to discuss the results, if she is interested.    Face to face time: 40 minutes    Monica Beck MS, [facility]  Licensed Genetic Counselor  Office: [phone]  Pager: [phone]

## 2018-10-23 NOTE — NURSING NOTE
"Chief Complaint   Patient presents with     RECHECK     Six month follow-up.       Vitals:    10/23/18 1055   BP: 133/73   BP Location: Left arm   Patient Position: Sitting   Cuff Size: Adult Regular   Pulse: 97   Temp: 98.1  F (36.7  C)   TempSrc: Oral   Weight: 151 lb 12.8 oz   Height: 5' 5\"       Body mass index is 25.26 kg/(m^2).      Constantine Tomlin, EMT                      "

## 2018-10-23 NOTE — PROGRESS NOTES
"Colon and Rectal Surgery Clinic Note      RE: Mya Gilmore  : 1950  HECTOR: 10/23/2018    Mya returns for a 6 month follow up of her stage IIIb/cT3 N3 M0 anal cancer s/p chemoradiation 2014. Her last clinic visit was 2018 that showed anal telangiectasias without evidence for recurrence. Since this time, the patient has had no concerns or issues. Regular bowel movements once daily without constipation or diarrhea. No further evidence for hematochezia. The patient has not completed a colonoscopy, noting that she would prefer to undergo a colonoscopy at the [facility] vs her home in [place]. She has an appointment with the genetic counselor today to discuss genetic testing given personal history of DCIS and family history of ovarian cancer. Last mammogram 2018 that was normal. Patient underwent ovarian removal in , however, the right ovary was unable to be identified, therefore, undergoing pelvic US to further evaluate.         Physical examination:  Examination was chaperoned by Laura Moreland CNP    Vitals: /73 (BP Location: Left arm, Patient Position: Sitting, Cuff Size: Adult Regular)  Pulse 97  Temp 98.1  F (36.7  C) (Oral)  Ht 1.651 m (5' 5\")  Wt 68.9 kg (151 lb 12.8 oz)  BMI 25.26 kg/m2  BMI= Body mass index is 25.26 kg/(m^2).    Perianal examination shows post radiation skin changes including telangiectasias. Distal rectal examination shows white scar tissue present right anterolateral across the midline to the left anterolateral position. No evidence for malignancy. Mucosa intact. Telangiectasias present internal anal without active bleeding.     Laboratory data:    Recent Labs   Lab Test  17   1029   16   1232   WBC  5.7   < >   --    HGB  13.5   < >  12.9   PLT  319   < >  174   CR  0.67   < >   --    ALBUMIN  3.7   < >   --    BILITOTAL  0.7   < >   --    ALKPHOS  96   < >   --    ALT  12   < >   --    AST  15   < >   --    INR   --    --   " "1.03    < > = values in this interval not displayed.       Assessment/plan:  No evidence of tumor recurrence. Anal telangiectasias present without active bleeding. Plan for colonoscopy here, patient will make appointment. She will meet with genetic counselor today. Return to clinic in 6 months.        For details of past medical history, surgical history, family history, medications, allergies, and review of systems, please see details below.    Medical history:  Past Medical History:   Diagnosis Date     Breast cancer (H) 2010    Stage 0     Cancer (H) 8/28/2014    Rectal      Hemorrhoid      Irritable bowel syndrome      Osteopenia        Surgical history:  Past Surgical History:   Procedure Laterality Date     COLONOSCOPY  8/2013     LUMPECTOMY BREAST      Right breast     TUBAL LIGATION         Family history:  Family History   Problem Relation Age of Onset     Cancer Mother      ovarian     Cancer Sister      colon       Medications:  Current Outpatient Prescriptions   Medication Sig Dispense Refill     magnesium 100 MG CAPS        Melatonin 10 MG TABS tablet Take 10 mg by mouth       Probiotic Product ([employer] [employer] [facility])          Allergies:  The patientis allergic to nka [no known allergies].    Social history:  Social History   Substance Use Topics     Smoking status: Former Smoker     Smokeless tobacco: Never Used      Comment: Was social smoker until age 28;      Alcohol use Yes      Comment: occasionally     Marital status: .    Review of Systems:  Nursing Notes:   Constantine Tomlin CMA  10/23/2018 10:58 AM  Signed  Chief Complaint   Patient presents with     RECHECK     Six month follow-up.       Vitals:    10/23/18 1055   BP: 133/73   BP Location: Left arm   Patient Position: Sitting   Cuff Size: Adult Regular   Pulse: 97   Temp: 98.1  F (36.7  C)   TempSrc: Oral   Weight: 151 lb 12.8 oz   Height: 5' 5\"       Body mass index is 25.26 kg/(m^2).      Constantine Tomlin, EMT                  "      I saw and examined the patient, led the discussion and edited the resident note.  I agree with the assessment and plan as outlined.  I personally performed the examination, which shows no evidence of local recurrence.  Correlate will be seeing the genetic counselors this afternoon, and will be scheduling a colonoscopy.  I sent in in basket to Dr. Martin to see if the patient requires further oncologic follow-up or scans.    Total time 15 minutes.  Greater than half the time was spent counseling.    Jose Ramon Fountain MD  Professor of Surgery      Jose Ramon Fountain MD   Professor and Chief  Division of Colon and Rectal Surgery  [facility] [place]      Referring Provider:  Soren Camara MD  [facility]  [address] [place] [address]     Primary Care Provider:  Soren Camara

## 2018-10-23 NOTE — MR AVS SNAPSHOT
"              After Visit Summary   10/23/2018    Mya Gilmore    MRN: [id number]           Patient Information     Date Of Birth          1950        Visit Information        Provider Department      10/23/2018 11:00 AM Jose Ramon Fountain MD [facility] Colon and Rectal Surgery        Care Instructions    Call [facility] ([facility]) at [phone] to set up colonoscopy  Return to clinic in 6 months          Follow-ups after your visit        Your next 10 appointments already scheduled     Oct 23, 2018 12:00 PM CDT   (Arrive by 11:45 AM)   NEW WITH ROOM with Monica Beck GC,  2 114 CONSULT RM   [facility] ([facility])    [address]  [address]  [facility] [address] [phone]            Oct 23, 2018  1:15 PM CDT   [facility] Draw with [facility]   [facility] ([facility])    [address]  [address]  [facility] [address] [phone]              Who to contact     Please call your clinic at [phone] to:    Ask questions about your health    Make or cancel appointments    Discuss your medicines    Learn about your test results    Speak to your doctor            Additional Information About Your Visit        Care EveryWhere ID     This is your Care EveryWhere ID. This could be used by other organizations to access your [place] medical records  [id number]        Your Vitals Were     Pulse Temperature Height BMI (Body Mass Index)          97 98.1  F (36.7  C) (Oral) 5' 5\" 25.26 kg/m2         Blood Pressure from Last 3 Encounters:   10/23/18 133/73   04/24/18 123/73   10/24/17 141/72    Weight from Last 3 Encounters:   10/23/18 151 lb 12.8 oz   04/24/18 153 lb 1.6 oz   10/24/17 148 lb 14.4 oz              Today, you had the following     No orders found for display       Primary Care Provider Office Phone # Fax #    Soren Camara [phone]    "    [facility] [address]  [facility] [address]        Equal Access to Services     LAYA PORTER : Hadii aad ku hadverenicefernanda Sowili, wablaireda lusivakumaradaha, qaybta ederivaniada marielyfaridakita, mani patten carlosharpal parishjanayelizabeth mckeon. So [facility] [phone].    ATENCIÓN: Si habla español, tiene a gupta disposición servicios gratuitos de asistencia lingüística. Llame al [phone].    We comply with applicable federal civil rights laws and [place] laws. We do not discriminate on the basis of race, color, national origin, age, disability, sex, sexual orientation, or gender identity.            Thank you!     Thank you for choosing [facility] COLON AND RECTAL SURGERY  for your care. Our goal is always to provide you with excellent care. Hearing back from our patients is one way we can continue to improve our services. Please take a few minutes to complete the written survey that you may receive in the mail after your visit with us. Thank you!             Your Updated Medication List - Protect others around you: Learn how to safely use, store and throw away your medicines at www.disposemymeds.org.          This list is accurate as of 10/23/18 11:30 AM.  Always use your most recent med list.                   Brand Name Dispense Instructions for use Diagnosis    magnesium 100 MG Caps           Melatonin 10 MG Tabs tablet      Take 10 mg by mouth        [facility]

## 2018-10-23 NOTE — PROGRESS NOTES
10/23/2018    Referring Provider: Jose Ramon Fountain MD    Presenting Information:   I met with Mya Gilmore today for genetic counseling at the Cancer Risk Management Program at the [facility] to discuss her personal history of breast and anorectum cancer, as well as her family history of ovarian and colon cancer. She is here today to review this history, cancer screening recommendations, and available genetic testing options.    Personal History:  Mya is a 68 year old female. 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 Treatment included a lumpectomy and radiation. 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      She had her first menstrual period at age 11, her first child at age 32, and went through menopause in her 50's. Mya has her uterus in place. She had surgery to remove her ovaries at age 67, though it was noted that only the left ovary and fallopian tube were identified. She reports a brief history of oral contraceptive use and that she used hormone replacement therapy for 5-10 years.      She has annual clinical breast exams and mammograms; she reports that her most recent mammogram in August 2018 was normal. Her most recent colonoscopy in 2013 was normal. Dr. Fountain has recommended that she schedule another colonoscopy in the near future. She does not regularly do any other cancer screening at this time. Mya reported that she quit smoking at age 28 and occasionally drinks alcohol.    Family History: (Please see scanned pedigree for detailed family history information)    One sister (unknown if a full sibling or maternal half sibling) was diagnosed with colon cancer in her 50's and passed away at age 57.    Mya's mother was diagnosed with ovarian cancer at age 74 and passed away at age 77; treatment included surgery, chemotherapy,  "and radiation.    One maternal aunt was diagnosed with throat cancer and passed away in her 50's; she had a history of smoking.    One maternal uncle was diagnosed with an unknown cancer in his 60's and passed away.    Mya reports that she has two potential biological fathers and provided information for both individuals.    One potential paternal family history:    Mya's biological father was possibly diagnosed with a bowel cancer and passed away at age 88; he may have also had a few colon polyps.    One paternal uncle was potentially diagnosed with and passed away from colon cancer in his 60's.    Other potential paternal family history:    One paternal uncle had a shoulder \"bump\" identified before passing away in his 60/70's that turned into a cancer.    Mya's paternal grandfather was diagnosed with and passed away from stomach cancer in his 40/50's; his smoking and alcohol history are unknown.    Her maternal ethnicity is Chinese Maori. Her paternal ethnicity may be Chinese or /. There is no known Ashkenazi Restorationist ancestry on either side of her family. There is no reported consanguinity.    Discussion:    Mya's personal history of breast cancer and family history of ovarian and colon cancer is suggestive of a hereditary cancer syndrome.    We reviewed the features of sporadic, familial, and hereditary cancers. In looking at Mya's family history, it is possible that a cancer susceptibility gene is present as she was diagnosed with breast cancer and has several close maternal and paternal relatives diagnosed with related cancers (i.e. ovarian, colon, stomach, etc.). Other cancers in Mya's family, including her anorectal cancer and maternal aunt's throat cancer, are less likely to have a hereditary explanation.    We discussed the natural history and genetics of several hereditary cancer syndromes, including Hereditary Breast and Ovarian Cancer (HBOC) syndrome and " Delgado syndrome. A detailed handout regarding these syndromes and the information we discussed was provided to Mya at the end of our appointment today and can be found in the after visit summary. Topics included: inheritance pattern, cancer risks, cancer screening recommendations, and also risks, benefits and limitations of testing.    We reviewed that the most common cause of hereditary breast and ovarian cancer is HBOC syndrome, which is caused by mutations in the BRCA1 and BRCA2 genes. Individuals with HBOC syndrome are at increased risk for several different cancers, including breast, ovarian, male breast, prostate, melanoma, and pancreatic cancer.    Based on her personal and family history, Mya meets current National Comprehensive Cancer Network (NCCN) criteria for genetic testing of BRCA1 and BRCA2.      Another potential explanation for the family history is Delgado syndrome, which is caused by mutations in the MLH1, MSH2, MSH6, PMS2, and EPCAM genes. Individuals with Delgado syndrome are at increased risk for several different cancers, including colon, ovarian, stomach, and potentially breast cancer.    We discussed that there are additional genes that could cause increased risk for the cancers in Mya's family. As many of these genes present with overlapping features in a family and accurate cancer risk cannot always be established based upon the pedigree analysis alone, it would be reasonable for Mya to consider panel genetic testing to analyze multiple genes at once.    Mya expressed interest in learning as much information as possible from genetic testing. As such, we discussed that genetic testing is available for 34 genes associated with hereditary cancer: CancerNext (APC, VIMAL, BARD1, BRCA1, BRCA2, BRIP1, BMPR1A, CDH1, CDK4, CDKN2A, CHEK2, DICER1, EPCAM, GREM1, HOXB13, MLH1, MRE11A, MSH2, MSH6, MUTYH, NBN, NF1, PALB2, PMS2, POLD1, POLE, PTEN, RAD50, RAD51C, RAD51D, SMAD4, SMARCA4,  STK11, and TP53).    We discussed that many of the genes in the CancerNext panel are associated with specific hereditary cancer syndromes and published management guidelines: HBOC syndrome (BRCA1, BRCA2), Delgado syndrome (MLH1, MSH2, MSH6, PMS2, EPCAM), Familial Adenomatous Polyposis (APC), Hereditary Diffuse Gastric Cancer (CDH1), Familial Atypical Multiple Mole Melanoma syndrome (CDK4, CDKN2A), Juvenile Polyposis syndrome (BMPR1A, SMAD4), Cowden syndrome (PTEN), Li Fraumeni syndrome (TP53), Peutz-Jeghers syndrome (STK11), MUTYH Associated Polyposis (MUTYH), and Neurofibromatosis type 1 (NF1).     The VIMAL, BRIP1, CHEK2, GREM1, NBN , PALB2, POLD1, POLE, RAD51C, and RAD51D genes are associated with increased cancer risk and have published management guidelines for certain cancers.      The remaining genes (BARD1, DICER1, HOXB13, MRE11A, RAD50, and SMARCA4) are associated with increased cancer risk and may allow us to make medical recommendations when mutations are identified.      Mya was provided with a detailed brochure from [facility] explaining the CancerNext testing.    Consent was obtained and genetic testing for CancerNext was sent to [facility]. Turn around time: 3-4 weeks.    Medical Management: For Mya, we reviewed that the information from genetic testing may determine:    additional cancer screening for which Mya may qualify (i.e. annual mammogram and breast MRI, colonoscopies every 1-5 years, more frequent dermatologic exams, etc.),    options for risk reducing surgeries Mya could consider (i.e. bilateral mastectomy, surgery to remove her uterus, etc.),      and targeted chemotherapies if she were to develop certain cancers in the future (i.e. immunotherapy for individuals with Delgado syndrome, PARP inhibitors, etc.).     These recommendations and possible targeted chemotherapies will be discussed in detail once genetic testing is completed.     Plan:  1) Today  Mya elected to proceed with genetic testing using the CancerNext panel offered by [facility].  2) This information should be available in 3-4 weeks.  3) Mya will first be called with her test results. She will then be invited back to clinic to discuss the results, if she is interested.    Face to face time: 40 minutes    Monica Beck MS, [facility]  Licensed Genetic Counselor  Office: [phone]  Pager: [phone]

## 2018-10-23 NOTE — PATIENT INSTRUCTIONS
Call [facility] ([facility]) at [phone] to set up colonoscopy  Return to clinic in 6 months

## 2018-10-23 NOTE — PATIENT INSTRUCTIONS
Assessing Cancer Risk  Only about 5-10% of cancers are thought to be due to an inherited cancer susceptibility gene.    These families often have:    Several people with the same or related types of cancer    Cancers diagnosed at a young age (before age 50)    Individuals with more than one primary cancer    Multiple generations of the family affected with cancer    Some people may be candidates for genetic testing of more than one gene.  For these families, genetic testing using a cancer panel may be offered.  These panels will test different genes known to increase the risk for breast, ovarian, uterine, and/or other cancers. All of the genes discussed below have published clinical management guidelines for individuals who are found to carry a mutation. The purpose of this handout is to serve as a brief summary of the genes analyzed by the panels used to inquire about hereditary breast and gynecologic cancer:  VIMAL, BRCA1, BRCA2, BRIP1, CDH1, CHEK2, MLH1, MSH2, MSH6, PMS2, EPCAM, PTEN, PALB2, RAD51C, RAD51D, and TP53.  ______________________________________________________________________________  Hereditary Breast and Ovarian Cancer Syndrome   (BRCA1 and BRCA2)  A single mutation in one of the copies of BRCA1 or BRCA2 increases the risk for breast and ovarian cancer, among others.  The risk for pancreatic cancer and melanoma may also be slightly increased in some families.  The chart below shows the chance that someone with a BRCA mutation would develop cancer in his or her lifetime1,2,3,4.        A person s ethnic background is also important to consider, as individuals of Ashkenazi Hinduism ancestry have a higher chance of having a BRCA gene mutation.  There are three BRCA mutations that occur more frequently in this population.    Delgado Syndrome   (MLH1, MSH2, MSH6, PMS2, and EPCAM)  Currently five genes are known to cause Delgado Syndrome: MLH1, MSH2, MSH6, PMS2, and EPCAM.  A single mutation in one of the  Delgado Syndrome genes increases the risk for colon, endometrial, ovarian, and stomach cancers.  Other cancers that occur less commonly in Delgado Syndrome include urinary tract, skin, and brain cancers.  The chart below shows the chance that a person with Delgado syndrome would develop cancer in his or her lifetime5.      *Cancer risk varies depending on Delgado syndrome gene found    Cowden Syndrome   (PTEN)  Cowden syndrome is a hereditary condition that increases the risk for breast, thyroid, endometrial, colon, and kidney cancer.  Cowden syndrome is caused by a mutation in the PTEN gene.  A single mutation in one of the copies of PTEN causes Cowden syndrome and increases cancer risk.  The chart below shows the chance that someone with a PTEN mutation would develop cancer in their lifetime6,7.  Other benign features seen in some individuals with Cowden syndrome include benign skin lesions (facial papules, keratoses, lipomas), learning disability, autism, thyroid nodules, colon polyps, and larger head size.      *One recent study found breast cancer risk to be increased to 85%    Li-Fraumeni Syndrome   (TP53)  Li-Fraumeni Syndrome (LFS) is a cancer predisposition syndrome caused by a mutation in the TP53 gene. A single mutation in one of the copies of TP53 increases the risk for multiple cancers. Individuals with LFS are at an increased risk for developing cancer at a young age. The lifetime risk for development of a LFS-associated cancer is 50% by age 30 and 90% by age 60.   Core Cancers: Sarcomas, Breast, Brain, Lung, Leukemias/Lymphomas, Adrenocortical carcinomas  Other Cancers: Gastrointestinal, Thyroid, Skin, Genitourinary    Hereditary Diffuse Gastric Cancer   (CDH1)  Currently, one gene is known to cause hereditary diffuse gastric cancer (HDGC): CDH1.  Individuals with HDGC are at increased risk for diffuse gastric cancer and lobular breast cancer. Of people diagnosed with HDGC, 30-50% have a mutation in the CDH1  gene.  This suggests there are likely other genes that may cause HDGC that have not been identified yet.      Lifetime Cancer Risks    General Population HDGC    Diffuse Gastric  <1% ~80%   Breast 12% 39-52%         Additional Genes  VIMAL  VIMAL is a moderate-risk breast cancer gene. Women who have a mutation in VIMAL can have between a 2-4 fold increased risk for breast cancer compared to the general population8. VIMAL mutations have also been associated with increased risk for pancreatic cancer, however an estimate of this cancer risk is not well understood9. Individuals who inherit two VIMAL mutations have a condition called ataxia-telangiectasia (AT).  This rare autosomal recessive condition affects the nervous system and immune system, and is associated with progressive cerebellar ataxia beginning in childhood.  Individuals with ataxia-telangiectasia often have a weakened immune system and have an increased risk for childhood cancers.    PALB2  Mutations in PALB2 have been shown to increase the risk of breast cancer up to 33-58% in some families; where individuals fall within this risk range is dependent upon family [id number]. PALB2 mutations have also been associated with increased risk for pancreatic cancer, although this risk has not been quantified yet.  Individuals who inherit two PALB2 mutations--one from their mother and one from their father--have a condition called Fanconi Anemia.  This rare autosomal recessive condition is associated with short stature, developmental delay, bone marrow failure, and increased risk for childhood cancers.    CHEK2   CHEK2 is a moderate-risk breast cancer gene.  Women who have a mutation in CHEK2 have around a 2-fold increased risk for breast cancer compared to the general population, and this risk may be higher depending upon family history.11,12,13 Mutations in CHEK2 have also been shown to increase the risk of a number of other cancers, including colon and prostate, however  these cancer risks are currently not well understood.    BRIP1, RAD51C and RAD51D  Mutations in BRIP1, RAD51C, and RAD51D have been shown to increase the risk of ovarian cancer and possibly female breast cancer as well14,15 .       Lifetime Cancer Risk    General Population BRIP1 RAD51C RAD51D   Ovarian 1-2% ~5-8% ~5-9% ~7-15%           Inheritance  All of the cancer syndromes reviewed above are inherited in an autosomal dominant pattern.  This means that if a parent has a mutation, each of his or her children will have a 50% chance of inheriting that same mutation.  Therefore, each child--male or female--would have a 50% chance of being at increased risk for developing cancer.      Image obtained from Genetics Home Reference, 2013     Mutations in some genes can occur de rhianna, which means that a person s mutation occurred for the first time in them and was not inherited from a parent.  Now that they have the mutation, however, it can be passed on to future generations.    Genetic Testing  Genetic testing involves a blood test and will look at the genetic information in the VIMAL, BRCA1, BRCA2, BRIP1, CDH1, CHEK2, MLH1, MSH2, MSH6, PMS2, EPCAM, PTEN, PALB2, RAD51C, RAD51D, and TP53 genes for any harmful mutations that are associated with increased cancer risk.  If possible, it is recommended that the person(s) who has had cancer be tested before other family members.  That person will give us the most useful information about whether or not a specific gene is associated with the cancer in the family.    Results  There are three possible results of genetic testing:    Positive--a harmful mutation was identified in one or more of the genes    Negative--no mutation was identified in any of the genes on this panel    Variant of unknown significance--a variation in one of the genes was identified, but it is unclear how this impacts cancer risk in the family    Advantages and Disadvantages   There are advantages and  disadvantages to genetic testing.    Advantages    May clarify your cancer risk    Can help you make medical decisions    May explain the cancers in your family    May give useful information to your family members (if you share your results)    Disadvantages    Possible negative emotional impact of learning about inherited cancer risk    Uncertainty in interpreting a negative test result in some situations    Possible genetic discrimination concerns (see below)    Genetic Information Nondiscrimination Act (RAJESH)  RAJESH is a federal law that protects individuals from health insurance or employment discrimination based on a genetic test result alone.  Although rare, there are currently no legal discrimination protections in terms of life insurance, long term care, or disability insurances.  Visit the [employer] [place] website to learn more.    Reducing Cancer Risk  All of the genes described above have nationally recognized cancer screening guidelines that would be recommended for individuals who test positive.  In addition to increased cancer screening, surgeries may be offered or recommended to reduce cancer risk.  Recommendations are based upon an individual s genetic test result as well as their personal and family history of cancer.    Questions to Think About Regarding Genetic Testing:    What effect will the test result have on me and my relationship with my family members if I have an inherited gene mutation?  If I don t have a gene mutation?    Should I share my test results, and how will my family react to this news, which may also affect them?    Are my children ready to learn new information that may one day affect their own health?    Hereditary Cancer Resources    FORCE: Facing Our Risk of Cancer Empowered facingourrisk.org   Bright Pink bebrightpink.org   Li-Fraumeni Syndrome Association lfsassociation.org   PTEN World PTENworld.com   No stomach for cancer, Inc.  nostomachforcancer.org   Stomach cancer relief network Scrnet.org   Collaborative Group of the Americas on Inherited Colorectal Cancer (CGA) cgaicc.com    Cancer Care cancercare.org   American Cancer Society (ACS) cancer.org   National Cancer [place] (NCI) cancer.gov     Please call us if you have any questions or concerns.   Cancer Risk Management Program [phone] ([phone])  ? Kari Morillo, MS, [facility]  [phone]  ? Aparna Peter, MS, [facility]  [phone]  ? Tori Solano, MS, [facility]  [phone]  ? Marva Jennings, MS, [facility]  [phone]  ? Monica Kiran, MS, [facility] [phone]    References  1. Jefferson BUSH, Casey PDP, Rita S, Jonathon OLIVEROS, Arcenio JE, Leny JL, Iris N, Shavon H, Joce O, Milka A, Suraj B, Lesa P, Rashida S, Janelle DM, Pablo N, Aury E, Delmer H, Michael E, Lubinski J, Gronwald J, Irma B, Tulinius H, Thorlacius S, Eerola H, Muna H, Byron K, Neto OP. Average risks of breast and ovarian cancer associated with BRCA1 or BRCA2 mutations detected in case series unselected for family history: a combined analysis of 222 studies. Am J Hum Josie. 2003;72:1117-30.  2. Shar N, Annemarie M, Frankie G.  BRCA1 and BRCA2 Hereditary Breast and Ovarian Cancer. Gene Reviews online. 2013.  3. Jacobo YC, She S, Ariel G, Corley S. Breast cancer risk among male BRCA1 and BRCA2 mutation carriers. J Natl Cancer Inst. 2007;99:1811-4.  4. Sravan BANKS, Megan I, Alexander J, Jass E, Krystin ER, Cierra F. Risk of breast cancer in male BRCA2 carriers. J Med Josie. 2010;47:710-1.  5. National Comprehensive Cancer Network. Clinical practice guidelines in oncology, colorectal cancer screening. Available online (registration required). 2015.  6. Yaya RAMIRES, Steffanie J, Sebastien J, Sayra LA, Lea MS, Eng C. Lifetime cancer risks in individuals with germline PTEN mutations. Clin Cancer Res. 2012;18:400-7.  7. Michela GALICIA. Cowden Syndrome: A Critical Review of the Clinical Literature. J Josie .  2009:18:13-27.  8. Alan A, Pal D, Mario S, Yamilka P, Fortino T, Ingrid M, Dong B, Manny H, Rosamaria R, Any K, Fatemeh L, Sravan DG, Janelle D, Brian DF, Karyn MR, The Breast Cancer Susceptibility Collaboration () & Diana TODD. VIMAL mutations that cause ataxia-telangiectasia are breast cancer susceptibility alleles. Nature Genetics. 2006;38:873-875  9. Will N , Jonathan Y, Sisi J, Christiano L, Jailyn GM , Penelope ML, Gallinger S, Shukla AG, Syngal S, Clark ML, Madina J , Cassie R, Lana SZ, Marek JR, Debi VE, Philomena M, Voabhilash B, Gala N, Gildardo RH, Kamaljit KW, and Beulah AP. VIMAL mutations in patients with hereditary pancreatic cancer. Cancer Discover. 2012;2:41-46  10. Jefferson PAULINO, et al. Breast-Cancer Risk in Families with Mutations in PALB2. NEJM. 2014; 371(6):497-506.  11. CHEK2 Breast Cancer Case-Control Consortium. CHEK2*1100delC and susceptibility to breast cancer: A collaborative analysis involving 10,860 breast cancer cases and 9,065 controls from 10 studies. Am J Hum Josie, 74 (2004), pp. 0195-4739  12. Vida T, Corie S, Ankush K, et al. Spectrum of Mutations in BRCA1, BRCA2, CHEK2, and TP53 in Families at High Risk of Breast Cancer. JHOAN. 2006;295(12):5633-5429.   13. Oral C, Estrella D, Ewa A, et al. Risk of breast cancer in women with a CHEK2 mutation with and without a family history of breast cancer. J Clin Oncol. 2011;29:5624-9191.  14. Collin JOHNSON, Andrey E, Rui SJ, et al. Contribution of germline mutations in the RAD51B, RAD51C, and RAD51D genes to ovarian cancer in the population. J Clin Oncol. 2015;33(26):8023-8865. Doi:10.1200/JCO.2015.61.2408.  15. Delia T, Escobar TARANGO, Fouzia P, et al. Mutations in BRIP1 confer high risk of ovarian cancer. Sofía Josie. 2011;43(11):7131-0516. doi:10.1038/ng.955.

## 2018-11-01 ENCOUNTER — TELEPHONE (OUTPATIENT)
Dept: SURGERY | Facility: CLINIC | Age: 68
End: 2018-11-01

## 2018-11-01 NOTE — TELEPHONE ENCOUNTER
Called patient to follow up on scheduling colonoscopy with Dr. Fountain, and follow up appointment with Dr. Martin.  Left message with my direct number to schedule.

## 2018-11-16 LAB — LAB SCANNED RESULT: NORMAL

## 2019-01-21 ENCOUNTER — VIRTUAL VISIT (OUTPATIENT)
Dept: ONCOLOGY | Facility: CLINIC | Age: 69
End: 2019-01-21
Attending: GENETIC COUNSELOR, MS
Payer: COMMERCIAL

## 2019-01-21 DIAGNOSIS — Z80.41 FAMILY HISTORY OF MALIGNANT NEOPLASM OF OVARY: ICD-10-CM

## 2019-01-21 DIAGNOSIS — Z85.3 PERSONAL HISTORY OF MALIGNANT NEOPLASM OF BREAST: Primary | ICD-10-CM

## 2019-01-21 DIAGNOSIS — Z80.0 FAMILY HISTORY OF COLON CANCER: ICD-10-CM

## 2019-01-21 PROCEDURE — 40000072 ZZH STATISTIC GENETIC COUNSELING, < 16 MIN

## 2019-01-21 NOTE — PROGRESS NOTES
"1/21/2019    Referring Provider: Jose Ramon Fountain MD    Presenting Information:  I spoke to Mya by phone today to discuss her genetic testing results. Her blood was drawn on 10/23/2018. BRCA1/2 Analyses with the CancerNext panel was ordered from [employer]. This testing was done because of Mya's personal history of breast cancer and family history of ovarian and gastrointestinal cancers.    Genetic Testing Result: NEGATIVE  Mya is negative for mutations in the APC, VIMAL, BARD1, BRCA1, BRCA2, BRIP1, BMPR1A, CDH1, CDK4, CDKN2A, CHEK2, DICER1, EPCAM, HOXB13, GREM1, MLH1, MRE11A, MSH2, MSH6, MUTYH, NBN, NF1, PALB2, PMS2, POLD1, POLE, PTEN, RAD50, RAD51C, RAD51D, SMAD4, SMARCA4, STK11, and TP53 genes. No mutations were found in any of the 34 genes analyzed. This test involved sequencing and deletion/duplication analysis of all genes with the exception of EPCAM and GREM1 (deletions only).    Testing did not detect an identifiable mutation associated with Hereditary Breast and Ovarian Cancer syndrome (BRCA1, BRCA2), Delgado syndrome (MLH1, MSH2, MSH6, PMS2, EPCAM), Familial Adenomatous Polyposis (APC), Hereditary Diffuse Gastric Cancer (CDH1), Familial Atypical Multiple Mole Melanoma syndrome (CDK4, CDKN2A), Juvenile Polyposis syndrome (BMPR1A, SMAD4), Cowden syndrome (PTEN), Li Fraumeni syndrome (TP53), Peutz-Jeghers syndrome (STK11), MUTYH Associated Polyposis (MUTYH), or Neurofibromatosis type 1 (NF1).    A copy of the test report can be found in the Laboratory tab, dated 10/23/2018, and named \"[facility]\". The report is scanned in as a linked document.    Interpretation:  We discussed several different interpretations of this negative test result.    1. One explanation may be that there is a different gene or combination of genes and environment that are associated with the cancers in Mya and/or her relatives. If that is the case, additional genetic testing may be available in the future " that can identify a genetic cause for Mya's breast cancer. As such, she is encouraged to contact me annually to review any new genetic testing options that may be appropriate for her.   2. It is possible that her other relatives with cancer, such as her mother, did have a mutation in one of these 34 genes and Mya did not inherit it. If that is the case, Mya's cancers may be sporadic and caused by random cellular changes.  3. There is also a small possibility that there is a mutation in one of these genes, and we could not find it with our current testing methods.       Screening:  Based on this negative test result, it is important for Mya and her relatives to refer back to the family history for appropriate cancer screening.      Mya should continue to follow all breast and anal squamous cell cancer treatment and screening recommendations as made by her medical providers.    Mya s close female relatives remain at increased risk for breast cancer given their family history. Breast cancer screening is generally recommended to begin approximately 10 years younger than the earliest age of breast cancer diagnosis in the family, or at age 40, whichever comes first. In this family, screening may begin at age 40. Breast screening options should be discussed with an individual's primary care provider and a genetic counselor, to determine what screening is appropriate, or if additional screening (such as breast MRI) is necessary based on personal/family history factors.    Due to Mya's family history of ovarian cancer, she and her other close female relatives remain at slightly increased risk for ovarian cancer. We discussed available ovarian cancer screening (pelvic exams, CA-125 blood tests, and transvaginal ultrasounds) as well as the significant limitations of this screening. As such, this screening is not typically recommended. That being said, women in this family should discuss this  screening and the signs and symptoms of ovarian cancer with their primary OB/GYN provider, as they may have individualized recommendations. Of note, Mya previously had surgery to remove her left ovary (right ovary was not identified) which significantly reduces her risk for ovarian cancer.    Other population cancer screening options, such as those recommended by the American Cancer Society and the National Comprehensive Cancer Network (NCCN), are also appropriate for Mya and her family. These screening recommendations may change if there are changes to Mya's personal and/or family history. These recommendations may also change if Mya is able to confirm who are her biological paternal relatives. Final screening recommendations should be made by each individual's managing physician.    Inheritance:  We reviewed the autosomal dominant inheritance of mutations in these 34 genes. We discussed that Mya did not pass on an identifiable mutation in these genes to her children based on this test result. Mutations in these genes do not skip generations.      Additional Testing Considerations:  Although Mya's genetic testing result was negative, other relatives may still carry a gene mutation associated with hereditary cancer. Genetic counseling is recommended for her maternal half sister to discuss her genetic testing options. Mya's maternal half nieces and nephews could also consider meeting with a genetic counselor. If any of these relatives do pursue genetic testing, Mya is encouraged to contact me so that we may review the impact of their test results on Mya.     Summary:  We do not have an explanation for Mya's breast cancer. Because of that, it is important that she continue with cancer screening based on her personal and family history as discussed above.    Genetic testing is rapidly advancing, and new cancer susceptibility genes will most likely be identified in the  future. Therefore, I encouraged Mya to contact me annually or if there are changes in her personal or family history. This may change how we assess her cancer risk, screening, and the testing we would offer.    Plan:  1. Mya will be mailed a copy of her test results.  2. She plans to follow-up with her medical providers.  3. She should contact me annually, or sooner if her family history changes.    If Mya has any further questions, I encouraged her to contact me at [phone].    Time spent over the phone: 5 minutes    Monica Beck MS, [facility]  Licensed Genetic Counselor  Office: [phone]  Pager: [phone]

## 2019-01-21 NOTE — Clinical Note
"Please print and send a copy of this letter and the patient's genetic testing report to the patient.    Please enclose test report: \"Send outs misc test [[id number]] (Order [id number])\"  "

## 2019-01-21 NOTE — LETTER
Cancer Risk Management  Program [facility]  [facility]  Mailing Address  Cancer Risk Management Program  [facility]  [address] [place] [address]   [id number]    New patient appointments  [phone]  January 21, 2019    Mya Gilmore  [address] [place] [address]      Dear Mya,    It was a pleasure speaking with you over the phone recently regarding your genetic testing results. Here is a copy of the progress note summarizing our conversation. If you have any additional questions, please feel free to call.    1/21/2019    Referring Provider: Jose Ramon Fountain MD    Presenting Information:  I spoke to Mya by phone today to discuss her genetic testing results. Her blood was drawn on 10/23/2018. BRCA1/2 Analyses with the CancerNext panel was ordered from [employer] [facility]. This testing was done because of Mya's personal history of breast cancer and family history of ovarian and gastrointestinal cancers.    Genetic Testing Result: NEGATIVE  Mya is negative for mutations in the APC, VIMAL, BARD1, BRCA1, BRCA2, BRIP1, BMPR1A, CDH1, CDK4, CDKN2A, CHEK2, DICER1, EPCAM, HOXB13, GREM1, MLH1, MRE11A, MSH2, MSH6, MUTYH, NBN, NF1, PALB2, PMS2, POLD1, POLE, PTEN, RAD50, RAD51C, RAD51D, SMAD4, SMARCA4, STK11, and TP53 genes. No mutations were found in any of the 34 genes analyzed. This test involved sequencing and deletion/duplication analysis of all genes with the exception of EPCAM and GREM1 (deletions only).    Testing did not detect an identifiable mutation associated with Hereditary Breast and Ovarian Cancer syndrome (BRCA1, BRCA2), Delgado syndrome (MLH1, MSH2, MSH6, PMS2, EPCAM), Familial Adenomatous Polyposis (APC), Hereditary Diffuse Gastric Cancer (CDH1), Familial Atypical Multiple Mole  "Melanoma syndrome (CDK4, CDKN2A), Juvenile Polyposis syndrome (BMPR1A, SMAD4), Cowden syndrome (PTEN), Li Fraumeni syndrome (TP53), Peutz-Jeghers syndrome (STK11), MUTYH Associated Polyposis (MUTYH), or Neurofibromatosis type 1 (NF1).    A copy of the test report can be found in the Laboratory tab, dated 10/23/2018, and named \"[facility]\". The report is scanned in as a linked document.    Interpretation:  We discussed several different interpretations of this negative test result.    1. One explanation may be that there is a different gene or combination of genes and environment that are associated with the cancers in Mya and/or her relatives. If that is the case, additional genetic testing may be available in the future that can identify a genetic cause for Kevins breast cancer. As such, she is encouraged to contact me annually to review any new genetic testing options that may be appropriate for her.   2. It is possible that her other relatives with cancer, such as her mother, did have a mutation in one of these 34 genes and Mya did not inherit it. If that is the case, Kevins cancers may be sporadic and caused by random cellular changes.  3. There is also a small possibility that there is a mutation in one of these genes, and we could not find it with our current testing methods.       Screening:  Based on this negative test result, it is important for Mya and her relatives to refer back to the family history for appropriate cancer screening.      Mya should continue to follow all breast and anal squamous cell cancer treatment and screening recommendations as made by her medical providers.    Mya s close female relatives remain at increased risk for breast cancer given their family history. Breast cancer screening is generally recommended to begin approximately 10 years younger than the earliest age of breast cancer diagnosis in the family, or at age 40, whichever comes " first. In this family, screening may begin at age 40. Breast screening options should be discussed with an individual's primary care provider and a genetic counselor, to determine what screening is appropriate, or if additional screening (such as breast MRI) is necessary based on personal/family history factors.    Due to Mya's family history of ovarian cancer, she and her other close female relatives remain at slightly increased risk for ovarian cancer. We discussed available ovarian cancer screening (pelvic exams, CA-125 blood tests, and transvaginal ultrasounds) as well as the significant limitations of this screening. As such, this screening is not typically recommended. That being said, women in this family should discuss this screening and the signs and symptoms of ovarian cancer with their primary OB/GYN provider, as they may have individualized recommendations. Of note, Mya previously had surgery to remove her left ovary (right ovary was not identified) which significantly reduces her risk for ovarian cancer.    Other population cancer screening options, such as those recommended by the American Cancer Society and the National Comprehensive Cancer Network (NCCN), are also appropriate for Mya and her family. These screening recommendations may change if there are changes to Mya's personal and/or family history. These recommendations may also change if Mya is able to confirm who are her biological paternal relatives. Final screening recommendations should be made by each individual's managing physician.    Inheritance:  We reviewed the autosomal dominant inheritance of mutations in these 34 genes. We discussed that Mya did not pass on an identifiable mutation in these genes to her children based on this test result. Mutations in these genes do not skip generations.      Additional Testing Considerations:  Although Mya's genetic testing result was negative, other relatives may  still carry a gene mutation associated with hereditary cancer. Genetic counseling is recommended for her maternal half sister to discuss her genetic testing options. Mya's maternal half nieces and nephews could also consider meeting with a genetic counselor. If any of these relatives do pursue genetic testing, Mya is encouraged to contact me so that we may review the impact of their test results on Mya.     Summary:  We do not have an explanation for Mya's breast cancer. Because of that, it is important that she continue with cancer screening based on her personal and family history as discussed above.    Genetic testing is rapidly advancing, and new cancer susceptibility genes will most likely be identified in the future. Therefore, I encouraged Mya to contact me annually or if there are changes in her personal or family history. This may change how we assess her cancer risk, screening, and the testing we would offer.    Plan:  1. Mya will be mailed a copy of her test results.  2. She plans to follow-up with her medical providers.  3. She should contact me annually, or sooner if her family history changes.    If Mya has any further questions, I encouraged her to contact me at [phone].    Time spent over the phone: 5 minutes    Monica Beck MS, [facility]  Licensed Genetic Counselor  Office: [phone]  Pager: [phone]

## 2019-05-06 ENCOUNTER — TELEPHONE (OUTPATIENT)
Dept: SURGERY | Facility: CLINIC | Age: 69
End: 2019-05-06

## 2019-05-06 NOTE — TELEPHONE ENCOUNTER
Confirmed appt with patient tomorrow (5/7/19) @ 10:30am. Patient said she may be a little late due to having to drive 2 hours. Inform patient that is okay.     Hayden Martin LPN

## 2019-05-06 NOTE — PROGRESS NOTES
"Colon and Rectal Surgery Clinic Note      RE: Mya Gilmore  : 1950  HECTOR: 2019    Mya returns for a 6 month follow up of her stage IIIb/cT3 N3 M0 anal cancer s/p chemoradiation 2014.     Her last clinic visit was 10/23/2018 that showed anal telangiectasias without evidence for recurrence.    Since that time, met with a genetic counselor (history of breast cancer, family history of ovarian cancer) and testing was negative.    Has not yet gotten a colonoscopy (has been busy with work and vacation to [place]). Wants to make sure logistics are planned given that she lives 2 hours away.     Continues to feel anxious about recurrence, but denies any new symptoms. No new lumps or bumps. No bowel issues. Taking magnesium for constipation with good response. No bleeding. No complaints at this time.       Physical examination:  Examination was chaperoned by Laura Moreland, CNP    Vitals: /63 (BP Location: Left arm, Patient Position: Chair, Cuff Size: Adult Regular)   Pulse 93   Temp 98.6  F (37  C) (Oral)   Ht 1.651 m (5' 5\")   Wt 66.2 kg (146 lb)   SpO2 98%   BMI 24.30 kg/m    BMI= Body mass index is 24.3 kg/m .    Perianal examination shows post radiation skin changes including telangiectasias, unchanged. Distal rectal examination shows white scar tissue present right anterolateral across the midline to the left anterolateral position. No evidence for malignancy. Mucosa intact. Telangiectasias present internal anal without active bleeding.     Laboratory data:    Recent Labs   Lab Test  17   1029   16   1232   WBC  5.7   < >   --    HGB  13.5   < >  12.9   PLT  319   < >  174   CR  0.67   < >   --    ALBUMIN  3.7   < >   --    BILITOTAL  0.7   < >   --    ALKPHOS  96   < >   --    ALT  12   < >   --    AST  15   < >   --    INR   --    --   1.03    < > = values in this interval not displayed.       Assessment/plan:  No evidence of tumor recurrence. Anal " "telangiectasias present without active bleeding. Plan for colonoscopy here, patient will make appointment. Return to clinic in 6 months.      For details of past medical history, surgical history, family history, medications, allergies, and review of systems, please see details below.    Medical history:  Past Medical History:   Diagnosis Date     Breast cancer (H) 2010    Stage 0     Cancer (H) 8/28/2014    Rectal      Hemorrhoid      Irritable bowel syndrome      Osteopenia        Surgical history:  Past Surgical History:   Procedure Laterality Date     COLONOSCOPY  8/2013     LUMPECTOMY BREAST      Right breast     TUBAL LIGATION         Family history:  Family History   Problem Relation Age of Onset     Cancer Mother         ovarian     Cancer Sister         colon       Medications:  Current Outpatient Medications   Medication Sig Dispense Refill     magnesium 100 MG CAPS        Melatonin 10 MG TABS tablet Take 10 mg by mouth       Probiotic Product ([employer] PO)          Allergies:  The patientis allergic to nka [no known allergies].    Social history:  Social History     Tobacco Use     Smoking status: Former Smoker     Smokeless tobacco: Never Used     Tobacco comment: Was social smoker until age 28;    Substance Use Topics     Alcohol use: Yes     Comment: occasionally   Marital status:   Owns clothing boutique in [place]     Review of Systems:  Nursing Notes:   Benita Martin LPN  5/7/2019 10:45 AM  Signed  Chief Complaint   Patient presents with     Cancer     6 month f/u anal cancer.        Vitals:    05/07/19 1044   BP: 127/63   BP Location: Left arm   Patient Position: Chair   Cuff Size: Adult Regular   Pulse: 93   Temp: 98.6  F (37  C)   TempSrc: Oral   SpO2: 98%   Weight: 146 lb   Height: 5' 5\"       Body mass index is 24.3 kg/m .  Hayden Martin LPN                      I saw and examined the patient, led the discussion and edited the resident note.  I agree with the assessment " and plan as outlined.  I personally performed the examination, which shows no evidence of local recurrence.  Mya will be scheduling a colonoscopy.     Total time 15 minutes.  Greater than half the time was spent counseling.    Jose Ramon Fountain MD  Professor of Surgery      Jose Ramon Fountain MD   Professor and Chief  Division of Colon and Rectal Surgery  [facility]      Referring Provider:  Soren Camara MD  [facility]  [address] [place] [id number]     Primary Care Provider:  Soren Camara

## 2019-05-07 ENCOUNTER — OFFICE VISIT (OUTPATIENT)
Dept: SURGERY | Facility: CLINIC | Age: 69
End: 2019-05-07
Payer: COMMERCIAL

## 2019-05-07 ENCOUNTER — TELEPHONE (OUTPATIENT)
Dept: GASTROENTEROLOGY | Facility: CLINIC | Age: 69
End: 2019-05-07

## 2019-05-07 VITALS
SYSTOLIC BLOOD PRESSURE: 127 MMHG | OXYGEN SATURATION: 98 % | BODY MASS INDEX: 24.32 KG/M2 | DIASTOLIC BLOOD PRESSURE: 63 MMHG | TEMPERATURE: 98.6 F | HEART RATE: 93 BPM | HEIGHT: 65 IN | WEIGHT: 146 LBS

## 2019-05-07 DIAGNOSIS — C21.0 ANAL SQUAMOUS CELL CARCINOMA (H): Primary | ICD-10-CM

## 2019-05-07 ASSESSMENT — MIFFLIN-ST. JEOR: SCORE: 1178.13

## 2019-05-07 ASSESSMENT — PAIN SCALES - GENERAL: PAINLEVEL: NO PAIN (0)

## 2019-05-07 NOTE — NURSING NOTE
"Chief Complaint   Patient presents with     Cancer     6 month f/u anal cancer.        Vitals:    05/07/19 1044   BP: 127/63   BP Location: Left arm   Patient Position: Chair   Cuff Size: Adult Regular   Pulse: 93   Temp: 98.6  F (37  C)   TempSrc: Oral   SpO2: 98%   Weight: 146 lb   Height: 5' 5\"       Body mass index is 24.3 kg/m .  Hayden Martin LPN                     "

## 2019-05-07 NOTE — LETTER
"2019       RE: Mya Gilmore  [address] [place] [address]     Dear Colleague,    Thank you for referring your patient, Mya Gilmore, to the [facility] COLON AND RECTAL SURGERY at [facility]. Please see a copy of my visit note below.    Colon and Rectal Surgery Clinic Note    RE: Mya Gilmore  : 1950  HECTOR: 2019    Mya returns for a 6 month follow up of her stage IIIb/cT3 N3 M0 anal cancer s/p chemoradiation 2014.     Her last clinic visit was 10/23/2018 that showed anal telangiectasias without evidence for recurrence.    Since that time, met with a genetic counselor (history of breast cancer, family history of ovarian cancer) and testing was negative.    Has not yet gotten a colonoscopy (has been busy with work and vacation to [place]). Wants to make sure logistics are planned given that she lives 2 hours away.     Continues to feel anxious about recurrence, but denies any new symptoms. No new lumps or bumps. No bowel issues. Taking magnesium for constipation with good response. No bleeding. No complaints at this time.     Physical examination:  Examination was chaperoned by Laura Moreland, CNP    Vitals: /63 (BP Location: Left arm, Patient Position: Chair, Cuff Size: Adult Regular)   Pulse 93   Temp 98.6  F (37  C) (Oral)   Ht 1.651 m (5' 5\")   Wt 66.2 kg (146 lb)   SpO2 98%   BMI 24.30 kg/m     BMI= Body mass index is 24.3 kg/m .    Perianal examination shows post radiation skin changes including telangiectasias, unchanged. Distal rectal examination shows white scar tissue present right anterolateral across the midline to the left anterolateral position. No evidence for malignancy. Mucosa intact. Telangiectasias present internal anal without active bleeding.     Laboratory data:    Recent Labs   Lab Test  17   1029   16   1232   WBC  5.7   < >   --    HGB  13.5   < >  12.9   PLT  319   < > "  174   CR  0.67   < >   --    ALBUMIN  3.7   < >   --    BILITOTAL  0.7   < >   --    ALKPHOS  96   < >   --    ALT  12   < >   --    AST  15   < >   --    INR   --    --   1.03    < > = values in this interval not displayed.       Assessment/plan:  No evidence of tumor recurrence. Anal telangiectasias present without active bleeding. Plan for colonoscopy here, patient will make appointment. Return to clinic in 6 months.      For details of past medical history, surgical history, family history, medications, allergies, and review of systems, please see details below.    Medical history:  Past Medical History:   Diagnosis Date     Breast cancer (H) 2010    Stage 0     Cancer (H) 8/28/2014    Rectal      Hemorrhoid      Irritable bowel syndrome      Osteopenia        Surgical history:  Past Surgical History:   Procedure Laterality Date     COLONOSCOPY  8/2013     LUMPECTOMY BREAST      Right breast     TUBAL LIGATION         Family history:  Family History   Problem Relation Age of Onset     Cancer Mother         ovarian     Cancer Sister         colon       Medications:  Current Outpatient Medications   Medication Sig Dispense Refill     magnesium 100 MG CAPS        Melatonin 10 MG TABS tablet Take 10 mg by mouth       Probiotic Product ([employer] PO)          Allergies:  The patientis allergic to nka [no known allergies].    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        Vitals:    05/07/19 1044   BP: 127/63   BP Location: Left arm   Patient Position: Chair   Cuff Size: Adult  "Regular   Pulse: 93   Temp: 98.6  F (37  C)   TempSrc: Oral   SpO2: 98%   Weight: 146 lb   Height: 5' 5\"       Body mass index is 24.3 kg/m .  Hayden Martin LPN       I saw and examined the patient, led the discussion and edited the resident note.  I agree with the assessment and plan as outlined.  I personally performed the examination, which shows no evidence of local recurrence.  Mya will be scheduling a colonoscopy.     Total time 15 minutes.  Greater than half the time was spent counseling.    Jose Ramon Fountain MD  Professor of Surgery    Jose Ramon Fountain MD   Professor and Chief  Division of Colon and Rectal Surgery  [facility]    Referring Provider:  Soren Camara MD  [facility]  [address] [place] [address]     Primary Care Provider:  Soren Camara      "